# Patient Record
Sex: MALE | Race: WHITE | NOT HISPANIC OR LATINO | Employment: FULL TIME | URBAN - METROPOLITAN AREA
[De-identification: names, ages, dates, MRNs, and addresses within clinical notes are randomized per-mention and may not be internally consistent; named-entity substitution may affect disease eponyms.]

---

## 2024-01-02 ENCOUNTER — HOSPITAL ENCOUNTER (EMERGENCY)
Facility: HOSPITAL | Age: 44
Discharge: HOME | End: 2024-01-02
Attending: EMERGENCY MEDICINE
Payer: COMMERCIAL

## 2024-01-02 ENCOUNTER — APPOINTMENT (EMERGENCY)
Dept: CT IMAGING | Facility: HOSPITAL | Age: 44
End: 2024-01-02
Payer: COMMERCIAL

## 2024-01-02 VITALS
HEART RATE: 99 BPM | TEMPERATURE: 98.9 F | SYSTOLIC BLOOD PRESSURE: 177 MMHG | OXYGEN SATURATION: 100 % | RESPIRATION RATE: 17 BRPM | DIASTOLIC BLOOD PRESSURE: 98 MMHG | WEIGHT: 207.67 LBS

## 2024-01-02 DIAGNOSIS — R07.9 CHEST PAIN: Primary | ICD-10-CM

## 2024-01-02 LAB
2HR DELTA HS TROPONIN: 1 NG/L
ANION GAP SERPL CALCULATED.3IONS-SCNC: 12 MMOL/L
ATRIAL RATE: 91 BPM
BASOPHILS # BLD AUTO: 0.03 THOUSANDS/ÂΜL (ref 0–0.1)
BASOPHILS NFR BLD AUTO: 1 % (ref 0–1)
BUN SERPL-MCNC: 15 MG/DL (ref 5–25)
CALCIUM SERPL-MCNC: 9.2 MG/DL (ref 8.4–10.2)
CARDIAC TROPONIN I PNL SERPL HS: 4 NG/L
CARDIAC TROPONIN I PNL SERPL HS: 5 NG/L
CHLORIDE SERPL-SCNC: 101 MMOL/L (ref 96–108)
CO2 SERPL-SCNC: 22 MMOL/L (ref 21–32)
CREAT SERPL-MCNC: 1 MG/DL (ref 0.6–1.3)
D DIMER PPP FEU-MCNC: 0.48 UG/ML FEU
EOSINOPHIL # BLD AUTO: 0.06 THOUSAND/ÂΜL (ref 0–0.61)
EOSINOPHIL NFR BLD AUTO: 1 % (ref 0–6)
ERYTHROCYTE [DISTWIDTH] IN BLOOD BY AUTOMATED COUNT: 12.6 % (ref 11.6–15.1)
FLUAV RNA RESP QL NAA+PROBE: NEGATIVE
FLUBV RNA RESP QL NAA+PROBE: NEGATIVE
GFR SERPL CREATININE-BSD FRML MDRD: 91 ML/MIN/1.73SQ M
GLUCOSE SERPL-MCNC: 95 MG/DL (ref 65–140)
HCT VFR BLD AUTO: 37.9 % (ref 36.5–49.3)
HGB BLD-MCNC: 13 G/DL (ref 12–17)
IMM GRANULOCYTES # BLD AUTO: 0.02 THOUSAND/UL (ref 0–0.2)
IMM GRANULOCYTES NFR BLD AUTO: 0 % (ref 0–2)
LYMPHOCYTES # BLD AUTO: 0.83 THOUSANDS/ÂΜL (ref 0.6–4.47)
LYMPHOCYTES NFR BLD AUTO: 14 % (ref 14–44)
MCH RBC QN AUTO: 32.7 PG (ref 26.8–34.3)
MCHC RBC AUTO-ENTMCNC: 34.3 G/DL (ref 31.4–37.4)
MCV RBC AUTO: 96 FL (ref 82–98)
MONOCYTES # BLD AUTO: 0.46 THOUSAND/ÂΜL (ref 0.17–1.22)
MONOCYTES NFR BLD AUTO: 8 % (ref 4–12)
NEUTROPHILS # BLD AUTO: 4.62 THOUSANDS/ÂΜL (ref 1.85–7.62)
NEUTS SEG NFR BLD AUTO: 76 % (ref 43–75)
NRBC BLD AUTO-RTO: 0 /100 WBCS
P AXIS: 68 DEGREES
PLATELET # BLD AUTO: 232 THOUSANDS/UL (ref 149–390)
PMV BLD AUTO: 9.2 FL (ref 8.9–12.7)
POTASSIUM SERPL-SCNC: 5.1 MMOL/L (ref 3.5–5.3)
PR INTERVAL: 150 MS
QRS AXIS: 75 DEGREES
QRSD INTERVAL: 94 MS
QT INTERVAL: 392 MS
QTC INTERVAL: 482 MS
RBC # BLD AUTO: 3.97 MILLION/UL (ref 3.88–5.62)
RSV RNA RESP QL NAA+PROBE: NEGATIVE
SARS-COV-2 RNA RESP QL NAA+PROBE: NEGATIVE
SODIUM SERPL-SCNC: 135 MMOL/L (ref 135–147)
T WAVE AXIS: 51 DEGREES
VENTRICULAR RATE: 91 BPM
WBC # BLD AUTO: 6.02 THOUSAND/UL (ref 4.31–10.16)

## 2024-01-02 PROCEDURE — G1004 CDSM NDSC: HCPCS

## 2024-01-02 PROCEDURE — 36415 COLL VENOUS BLD VENIPUNCTURE: CPT | Performed by: EMERGENCY MEDICINE

## 2024-01-02 PROCEDURE — 74174 CTA ABD&PLVS W/CONTRAST: CPT

## 2024-01-02 PROCEDURE — 99285 EMERGENCY DEPT VISIT HI MDM: CPT

## 2024-01-02 PROCEDURE — 84484 ASSAY OF TROPONIN QUANT: CPT | Performed by: EMERGENCY MEDICINE

## 2024-01-02 PROCEDURE — 71275 CT ANGIOGRAPHY CHEST: CPT

## 2024-01-02 PROCEDURE — 85025 COMPLETE CBC W/AUTO DIFF WBC: CPT | Performed by: EMERGENCY MEDICINE

## 2024-01-02 PROCEDURE — 93005 ELECTROCARDIOGRAM TRACING: CPT

## 2024-01-02 PROCEDURE — 85379 FIBRIN DEGRADATION QUANT: CPT | Performed by: EMERGENCY MEDICINE

## 2024-01-02 PROCEDURE — 0241U HB NFCT DS VIR RESP RNA 4 TRGT: CPT | Performed by: EMERGENCY MEDICINE

## 2024-01-02 PROCEDURE — 80048 BASIC METABOLIC PNL TOTAL CA: CPT | Performed by: EMERGENCY MEDICINE

## 2024-01-02 PROCEDURE — 99285 EMERGENCY DEPT VISIT HI MDM: CPT | Performed by: EMERGENCY MEDICINE

## 2024-01-02 RX ORDER — DOXYCYCLINE HYCLATE 100 MG
100 TABLET ORAL 2 TIMES DAILY
Qty: 20 TABLET | Refills: 0 | Status: SHIPPED | OUTPATIENT
Start: 2024-01-02 | End: 2024-01-12

## 2024-01-02 RX ORDER — ALBUTEROL SULFATE 90 UG/1
2 AEROSOL, METERED RESPIRATORY (INHALATION) ONCE
Status: COMPLETED | OUTPATIENT
Start: 2024-01-02 | End: 2024-01-02

## 2024-01-02 RX ORDER — DOXYCYCLINE HYCLATE 100 MG/1
100 CAPSULE ORAL ONCE
Status: COMPLETED | OUTPATIENT
Start: 2024-01-02 | End: 2024-01-02

## 2024-01-02 RX ORDER — SODIUM CHLORIDE 9 MG/ML
3 INJECTION INTRAVENOUS
Status: DISCONTINUED | OUTPATIENT
Start: 2024-01-02 | End: 2024-01-02 | Stop reason: HOSPADM

## 2024-01-02 RX ADMIN — DOXYCYCLINE HYCLATE 100 MG: 100 CAPSULE ORAL at 17:53

## 2024-01-02 RX ADMIN — IOHEXOL 100 ML: 350 INJECTION, SOLUTION INTRAVENOUS at 15:38

## 2024-01-02 RX ADMIN — ALBUTEROL SULFATE 2 PUFF: 90 AEROSOL, METERED RESPIRATORY (INHALATION) at 17:53

## 2024-01-02 NOTE — DISCHARGE INSTRUCTIONS
No sign or heart or lung pathology at this time  Pain is most likely from chest wall pain or bronchitis.  Doxycycline twice daily to fight infection  Albuterol 2 puffs every 6 hours if needed for cough or tightness  Follow-up with your provider or provider for persistent symptoms  Return worsening symptoms or increasing pain or problems

## 2024-01-02 NOTE — ED PROVIDER NOTES
History  Chief Complaint   Patient presents with    Chest Pain     Pt BIB EMS from [employer] [facility] where he works. Was eating lunch and had a sudden onset of CP, SOB, dizziness, headache and feeling lightheaded. Pt normally takes lisinopril for HTN however he hasn't been tolerating it. Hasn't taken for weeks. Initial reports of tachycardia and BP of 202/104. Pt received 1 SL Nitro which brought his pain from 10 to 5. Also rec'd 324 ASA.     HPI is a 43-year-old male apparently sudden onset of severe chest pain described as sharp associated with feeling ill and shaky lightheaded dizzy.  Patient reports the pain is sharp and reports substernal.  He reports some difficulty breathing with the pain.  Patient was ill earlier today cold type symptoms.  Denies any vomiting or diarrhea.  Denies any fever or chills.  He reports primarily cough cold congestion.  Patient reports currently still has some anterior chest pain sharp in nature  Past medical history of hypertension, patient reports had a stress test but had to have it stopped due to hypertension at the time, apparently no follow-up or cardiac catheterization done  Family history noncontributory  Social history, non-smoker no history of drug abuse    None       Past Medical History:   Diagnosis Date    Hypertension        History reviewed. No pertinent surgical history.    History reviewed. No pertinent family history.  I have reviewed and agree with the history as documented.    E-Cigarette/Vaping    E-Cigarette Use Never User      E-Cigarette/Vaping Substances     Social History     Tobacco Use    Smoking status: Never    Smokeless tobacco: Never   Vaping Use    Vaping status: Never Used   Substance Use Topics    Alcohol use: Never    Drug use: Never       Review of Systems   Constitutional:  Negative for diaphoresis, fatigue and fever.   HENT:  Negative for congestion, ear pain, nosebleeds and sore throat.    Eyes:  Negative for photophobia, pain, discharge and visual  disturbance.   Respiratory:  Negative for cough, choking, chest tightness, shortness of breath and wheezing.    Cardiovascular:  Positive for chest pain. Negative for palpitations.   Gastrointestinal:  Negative for abdominal distention, abdominal pain, diarrhea and vomiting.   Genitourinary:  Negative for dysuria, flank pain and frequency.   Musculoskeletal:  Negative for back pain, gait problem and joint swelling.   Skin:  Negative for color change and rash.   Neurological:  Negative for dizziness, syncope and headaches.   Psychiatric/Behavioral:  Negative for behavioral problems and confusion. The patient is not nervous/anxious.    All other systems reviewed and are negative.      Physical Exam  Physical Exam  Vitals and nursing note reviewed.   Constitutional:       Appearance: He is well-developed.   HENT:      Head: Normocephalic.      Right Ear: External ear normal.      Left Ear: External ear normal.      Nose: Nose normal.      Mouth/Throat:      Mouth: Mucous membranes are moist.      Pharynx: Oropharynx is clear.   Eyes:      General: Lids are normal.      Extraocular Movements: Extraocular movements intact.      Pupils: Pupils are equal, round, and reactive to light.   Cardiovascular:      Rate and Rhythm: Normal rate and regular rhythm.      Pulses: Normal pulses.      Heart sounds: Normal heart sounds.   Pulmonary:      Effort: Pulmonary effort is normal. No respiratory distress.      Breath sounds: Normal breath sounds.   Abdominal:      General: Abdomen is flat. Bowel sounds are normal.      Tenderness: There is no abdominal tenderness.   Musculoskeletal:         General: No deformity. Normal range of motion.      Cervical back: Normal range of motion and neck supple.   Skin:     General: Skin is warm and dry.   Neurological:      Mental Status: He is alert and oriented to person, place, and time.   Psychiatric:         Mood and Affect: Mood normal.         Vital Signs  ED Triage Vitals   Temperature  Pulse Respirations Blood Pressure SpO2   01/02/24 1320 01/02/24 1320 01/02/24 1320 01/02/24 1320 01/02/24 1320   98.9 °F (37.2 °C) 99 17 (!) 177/98 98 %      Temp src Heart Rate Source Patient Position - Orthostatic VS BP Location FiO2 (%)   -- 01/02/24 1320 01/02/24 1320 01/02/24 1320 --    Monitor Lying Left arm       Pain Score       01/02/24 1324       5           Vitals:    01/02/24 1320   BP: (!) 177/98   Pulse: 99   Patient Position - Orthostatic VS: Lying         Visual Acuity      ED Medications  Medications   iohexol (OMNIPAQUE) 350 MG/ML injection (MULTI-DOSE) 100 mL (100 mL Intravenous Given 1/2/24 1538)   doxycycline hyclate (VIBRAMYCIN) capsule 100 mg (100 mg Oral Given 1/2/24 1753)   albuterol (PROVENTIL HFA,VENTOLIN HFA) inhaler 2 puff (2 puffs Inhalation Given 1/2/24 1753)       Diagnostic Studies  Results Reviewed       Procedure Component Value Units Date/Time    HS Troponin I 2hr [[id number]]  (Normal) Collected: 01/02/24 1551    Lab Status: Final result Specimen: Blood from Arm, Left Updated: 01/02/24 1621     hs TnI 2hr 5 ng/L      Delta 2hr hsTnI 1 ng/L     FLU/RSV/COVID - if FLU/RSV clinically relevant [[id number]]  (Normal) Collected: 01/02/24 1342    Lab Status: Final result Specimen: Nares from Nose Updated: 01/02/24 1426     SARS-CoV-2 Negative     INFLUENZA A PCR Negative     INFLUENZA B PCR Negative     RSV PCR Negative    Narrative:      FOR PEDIATRIC PATIENTS - copy/paste COVID Guidelines URL to browser: [email or web address]    SARS-CoV-2 assay is a Nucleic Acid Amplification assay intended for the  qualitative detection of nucleic acid from SARS-CoV-2 in nasopharyngeal  swabs. Results are for the presumptive identification of SARS-CoV-2 RNA.    Positive results are indicative of infection with SARS-CoV-2, the virus  causing COVID-19, but do not rule out bacterial infection or co-infection  with other viruses. Laboratories within the  [place] and its  territories are required to report all positive results to the appropriate  public health authorities. Negative results do not preclude SARS-CoV-2  infection and should not be used as the sole basis for treatment or other  patient management decisions. Negative results must be combined with  clinical observations, patient history, and epidemiological information.  This test has not been FDA cleared or approved.    This test has been authorized by FDA under an Emergency Use Authorization  (EUA). This test is only authorized for the duration of time the  declaration that circumstances exist justifying the authorization of the  emergency use of an in vitro diagnostic tests for detection of SARS-CoV-2  virus and/or diagnosis of COVID-19 infection under section 564(b)(1) of  the Act, 21 U.S.C. 360bbb-3(b)(1), unless the authorization is terminated  or revoked sooner. The test has been validated but independent review by FDA  and CLIA is pending.    Test performed using [employer] GeneXpert: This RT-PCR assay targets N2,  a region unique to SARS-CoV-2. A conserved region in the E-gene was chosen  for pan-Sarbecovirus detection which includes SARS-CoV-2.    According to CMS-2020-01-R, this platform meets the definition of high-throughput technology.    HS Troponin 0hr (reflex protocol) [[id number]]  (Normal) Collected: 01/02/24 1342    Lab Status: Final result Specimen: Blood from Arm, Left Updated: 01/02/24 1417     hs TnI 0hr 4 ng/L     Basic metabolic panel [[id number]] Collected: 01/02/24 1342    Lab Status: Final result Specimen: Blood from Arm, Left Updated: 01/02/24 1414     Sodium 135 mmol/L      Potassium 5.1 mmol/L      Chloride 101 mmol/L      CO2 22 mmol/L      ANION GAP 12 mmol/L      BUN 15 mg/dL      Creatinine 1.00 mg/dL      Glucose 95 mg/dL      Calcium 9.2 mg/dL      eGFR 91 ml/min/1.73sq m     Narrative:      National Kidney Disease Foundation guidelines for Chronic Kidney Disease  (CKD):     Stage 1 with normal or high GFR (GFR > 90 mL/min/1.73 square meters)    Stage 2 Mild CKD (GFR = 60-89 mL/min/1.73 square meters)    Stage 3A Moderate CKD (GFR = 45-59 mL/min/1.73 square meters)    Stage 3B Moderate CKD (GFR = 30-44 mL/min/1.73 square meters)    Stage 4 Severe CKD (GFR = 15-29 mL/min/1.73 square meters)    Stage 5 End Stage CKD (GFR <15 mL/min/1.73 square meters)  Note: GFR calculation is accurate only with a steady state creatinine    D-dimer, quantitative [[id number]]  (Normal) Collected: 01/02/24 1342    Lab Status: Final result Specimen: Blood from Arm, Left Updated: 01/02/24 1401     D-Dimer, Quant 0.48 ug/ml FEU     CBC and differential [[id number]]  (Abnormal) Collected: 01/02/24 1342    Lab Status: Final result Specimen: Blood from Arm, Left Updated: 01/02/24 1348     WBC 6.02 Thousand/uL      RBC 3.97 Million/uL      Hemoglobin 13.0 g/dL      Hematocrit 37.9 %      MCV 96 fL      MCH 32.7 pg      MCHC 34.3 g/dL      RDW 12.6 %      MPV 9.2 fL      Platelets 232 Thousands/uL      nRBC 0 /100 WBCs      Neutrophils Relative 76 %      Immat GRANS % 0 %      Lymphocytes Relative 14 %      Monocytes Relative 8 %      Eosinophils Relative 1 %      Basophils Relative 1 %      Neutrophils Absolute 4.62 Thousands/µL      Immature Grans Absolute 0.02 Thousand/uL      Lymphocytes Absolute 0.83 Thousands/µL      Monocytes Absolute 0.46 Thousand/µL      Eosinophils Absolute 0.06 Thousand/µL      Basophils Absolute 0.03 Thousands/µL                    CTA dissection protocol chest/abdomen/pelvis   Final Result by Dave Milan DO (01/02 1728)      No aortic aneurysm or dissection.   Fatty liver.            Workstation performed: [id number]                    Procedures  ECG 12 Lead Documentation Only    Date/Time: 1/2/2024 2:01 PM    Performed by: Raymond Gordon MD  Authorized by: Raymond Gordon MD    Indications / Diagnosis:  Chest pain  Patient location:  ED  Previous ECG:     Previous ECG:   Unavailable  Interpretation:     Interpretation: non-specific    Rate:     ECG rate:  91    ECG rate assessment: normal    Rhythm:     Rhythm: sinus rhythm    Comments:      Normal sinus rhythm no acute ST-T wave changes no ST elevations             ED Course       Diagnostic testing  COVID testing RSV testing and influenza testing was negative.  Initial cardiac troponin was 4 no sign of cardiac ischemia, will await second cardiac troponin  Electrolytes within normal limits normal BUN/creatinine no sign of renal dysfunction  D-dimer was negative no sign of pulmonary embolism low risk for pulmonary emboli  White count was normal at 6.0 no sign of inflammation hemoglobin is normal at 13 Asceniv anemia.    Patient second troponin was 5, delta of 1, no sign of ischemia no indication for admission low risk for coronary artery disease    Patient had sudden onset severe sharp pain consistent with a dissection so CT was done to rule out dissection it was negative.  Reviewed with patient gave him a copy of the report.    HEART Risk Score      Flowsheet Row Most Recent Value   Heart Score Risk Calculator    History 0 Filed at: 01/02/2024 1434   ECG 0 Filed at: 01/02/2024 1434   Age 0 Filed at: 01/02/2024 1434   Risk Factors 1 Filed at: 01/02/2024 1434   Troponin 0 Filed at: 01/02/2024 1434   HEART Score 1 Filed at: 01/02/2024 1434                          SBIRT 22yo+      Flowsheet Row Most Recent Value   Initial Alcohol Screen: US AUDIT-C     1. How often do you have a drink containing alcohol? 0 Filed at: 01/02/2024 1355   2. How many drinks containing alcohol do you have on a typical day you are drinking?  0 Filed at: 01/02/2024 1355   3a. Male UNDER 65: How often do you have five or more drinks on one occasion? 0 Filed at: 01/02/2024 1355   Audit-C Score 0 Filed at: 01/02/2024 1355   TOM: How many times in the past year have you...    Used an illegal drug or used a prescription medication for non-medical reasons? Never  Filed at: 01/02/2024 1355                      Medical Decision Making  Medical decision making 43-year-old male reports feeling somewhat ill earlier today with viral type symptoms at work developed sudden onset of severe sharp chest pain stabbing in nature going through his chest.  Patient was markedly uncomfortable initially with the pain he and arrived by EMS.  Diagnostic workup showed a EKG showed no acute changes no ST elevation MI.  Cardiac troponin was normal and repeat cardiac troponin was also normal delta of 1 no sign of acute ischemia.  Low risk by heart score.  Safe to discharge by heart pathway.  Patient complained of sharp chest pain so CT was done rule out dissection it was negative.  Discussed with patient no sign of ischemia no indication for admission he agrees we discussed outpatient treatment and follow-up patient had some cough and some congestion hospital bronchitis, will treat with antibiotics.  Patient requested an inhaler    Amount and/or Complexity of Data Reviewed  Labs: ordered.  Radiology: ordered.    Risk  Prescription drug management.             Disposition  Final diagnoses:   Chest pain     Time reflects when diagnosis was documented in both MDM as applicable and the Disposition within this note       Time User Action Codes Description Comment    1/2/2024  5:25 PM Raymond Gordon Add [R07.9] Chest pain           ED Disposition       ED Disposition   Discharge    Condition   Stable    Date/Time   Tue Jan 2, 2024 1725    Comment   Santiago Monterroso discharge to home/self care.                   Follow-up Information       Follow up With Specialties Details Why Contact Info Additional Information    [address] [id number] [phone] [address] [place], [id number] [phone]            Discharge Medication List as of 1/2/2024  5:46 PM        START  taking these medications    Details   doxycycline hyclate (VIBRA-TABS) 100 mg tablet Take 1 tablet (100 mg total) by mouth 2 (two) times a day for 10 days, Starting Tue 1/2/2024, Until Fri 1/12/2024, Normal             No discharge procedures on file.    PDMP Review       None            ED Provider  Electronically Signed by             Raymond Gordon MD  01/02/24 5624

## 2024-01-04 LAB
ATRIAL RATE: 78 BPM
P AXIS: 54 DEGREES
PR INTERVAL: 144 MS
QRS AXIS: 74 DEGREES
QRSD INTERVAL: 98 MS
QT INTERVAL: 400 MS
QTC INTERVAL: 456 MS
T WAVE AXIS: 61 DEGREES
VENTRICULAR RATE: 78 BPM

## 2024-10-17 ENCOUNTER — HOSPITAL ENCOUNTER (OUTPATIENT)
Facility: HOSPITAL | Age: 44
Setting detail: OBSERVATION
End: 2024-10-19
Attending: EMERGENCY MEDICINE | Admitting: INTERNAL MEDICINE
Payer: COMMERCIAL

## 2024-10-17 ENCOUNTER — APPOINTMENT (OUTPATIENT)
Dept: CT IMAGING | Facility: HOSPITAL | Age: 44
End: 2024-10-17
Payer: COMMERCIAL

## 2024-10-17 ENCOUNTER — TELEPHONE (OUTPATIENT)
Dept: PSYCHIATRY | Facility: CLINIC | Age: 44
End: 2024-10-17

## 2024-10-17 DIAGNOSIS — R65.10 SIRS (SYSTEMIC INFLAMMATORY RESPONSE SYNDROME) (HCC): ICD-10-CM

## 2024-10-17 DIAGNOSIS — N17.9 AKI (ACUTE KIDNEY INJURY) (HCC): ICD-10-CM

## 2024-10-17 DIAGNOSIS — Z00.8 MEDICAL CLEARANCE FOR PSYCHIATRIC ADMISSION: ICD-10-CM

## 2024-10-17 DIAGNOSIS — R46.89 ABNORMAL BEHAVIOR: Primary | ICD-10-CM

## 2024-10-17 DIAGNOSIS — F29 PSYCHOSIS (HCC): ICD-10-CM

## 2024-10-17 DIAGNOSIS — M62.82 RHABDOMYOLYSIS: ICD-10-CM

## 2024-10-17 PROBLEM — Z78.9 ALCOHOL USE: Status: ACTIVE | Noted: 2024-10-17

## 2024-10-17 PROBLEM — E87.20 METABOLIC ACIDOSIS: Status: ACTIVE | Noted: 2024-10-17

## 2024-10-17 PROBLEM — E87.1 HYPONATREMIA: Status: ACTIVE | Noted: 2024-10-17

## 2024-10-17 PROBLEM — G93.41 METABOLIC ENCEPHALOPATHY: Status: ACTIVE | Noted: 2024-10-17

## 2024-10-17 LAB
ALBUMIN SERPL BCG-MCNC: 4.7 G/DL (ref 3.5–5)
ALP SERPL-CCNC: 39 U/L (ref 34–104)
ALT SERPL W P-5'-P-CCNC: 38 U/L (ref 7–52)
ANION GAP SERPL CALCULATED.3IONS-SCNC: 20 MMOL/L (ref 4–13)
APAP SERPL-MCNC: <2 UG/ML (ref 10–20)
APTT PPP: 28 SECONDS (ref 23–34)
AST SERPL W P-5'-P-CCNC: 74 U/L (ref 13–39)
ATRIAL RATE: 95 BPM
BASOPHILS # BLD AUTO: 0.02 THOUSANDS/ΜL (ref 0–0.1)
BASOPHILS NFR BLD AUTO: 0 % (ref 0–1)
BILIRUB SERPL-MCNC: 2.23 MG/DL (ref 0.2–1)
BUN SERPL-MCNC: 35 MG/DL (ref 5–25)
CALCIUM SERPL-MCNC: 9.7 MG/DL (ref 8.4–10.2)
CHLORIDE SERPL-SCNC: 94 MMOL/L (ref 96–108)
CK SERPL-CCNC: 2968 U/L (ref 39–308)
CO2 SERPL-SCNC: 17 MMOL/L (ref 21–32)
CREAT SERPL-MCNC: 1.61 MG/DL (ref 0.6–1.3)
EOSINOPHIL # BLD AUTO: 0.01 THOUSAND/ΜL (ref 0–0.61)
EOSINOPHIL NFR BLD AUTO: 0 % (ref 0–6)
ERYTHROCYTE [DISTWIDTH] IN BLOOD BY AUTOMATED COUNT: 12.7 % (ref 11.6–15.1)
ETHANOL EXG-MCNC: 0 MG/DL
ETHANOL SERPL-MCNC: <10 MG/DL
FLUAV RNA RESP QL NAA+PROBE: NEGATIVE
FLUBV RNA RESP QL NAA+PROBE: NEGATIVE
GFR SERPL CREATININE-BSD FRML MDRD: 51 ML/MIN/1.73SQ M
GLUCOSE SERPL-MCNC: 101 MG/DL (ref 65–140)
HCT VFR BLD AUTO: 40.1 % (ref 36.5–49.3)
HGB BLD-MCNC: 13.2 G/DL (ref 12–17)
IMM GRANULOCYTES # BLD AUTO: 0.05 THOUSAND/UL (ref 0–0.2)
IMM GRANULOCYTES NFR BLD AUTO: 0 % (ref 0–2)
INR PPP: 0.91 (ref 0.85–1.19)
LACTATE SERPL-SCNC: 0.6 MMOL/L (ref 0.5–2)
LYMPHOCYTES # BLD AUTO: 1.49 THOUSANDS/ΜL (ref 0.6–4.47)
LYMPHOCYTES NFR BLD AUTO: 10 % (ref 14–44)
MAGNESIUM SERPL-MCNC: 2.1 MG/DL (ref 1.9–2.7)
MCH RBC QN AUTO: 32.4 PG (ref 26.8–34.3)
MCHC RBC AUTO-ENTMCNC: 32.9 G/DL (ref 31.4–37.4)
MCV RBC AUTO: 99 FL (ref 82–98)
MONOCYTES # BLD AUTO: 1.53 THOUSAND/ΜL (ref 0.17–1.22)
MONOCYTES NFR BLD AUTO: 11 % (ref 4–12)
NEUTROPHILS # BLD AUTO: 11.25 THOUSANDS/ΜL (ref 1.85–7.62)
NEUTS SEG NFR BLD AUTO: 79 % (ref 43–75)
NRBC BLD AUTO-RTO: 0 /100 WBCS
P AXIS: 77 DEGREES
PLATELET # BLD AUTO: 248 THOUSANDS/UL (ref 149–390)
PMV BLD AUTO: 9.7 FL (ref 8.9–12.7)
POTASSIUM SERPL-SCNC: 3.9 MMOL/L (ref 3.5–5.3)
PR INTERVAL: 148 MS
PROCALCITONIN SERPL-MCNC: 0.5 NG/ML
PROT SERPL-MCNC: 7.8 G/DL (ref 6.4–8.4)
PROTHROMBIN TIME: 13 SECONDS (ref 12.3–15)
QRS AXIS: 78 DEGREES
QRSD INTERVAL: 84 MS
QT INTERVAL: 362 MS
QTC INTERVAL: 454 MS
RBC # BLD AUTO: 4.07 MILLION/UL (ref 3.88–5.62)
RSV RNA RESP QL NAA+PROBE: NEGATIVE
SALICYLATES SERPL-MCNC: <5 MG/DL (ref 3–20)
SARS-COV-2 RNA RESP QL NAA+PROBE: NEGATIVE
SODIUM SERPL-SCNC: 131 MMOL/L (ref 135–147)
T WAVE AXIS: 74 DEGREES
VENTRICULAR RATE: 95 BPM
WBC # BLD AUTO: 14.35 THOUSAND/UL (ref 4.31–10.16)

## 2024-10-17 PROCEDURE — 96372 THER/PROPH/DIAG INJ SC/IM: CPT

## 2024-10-17 PROCEDURE — 99204 OFFICE O/P NEW MOD 45 MIN: CPT | Performed by: PSYCHIATRY & NEUROLOGY

## 2024-10-17 PROCEDURE — 80143 DRUG ASSAY ACETAMINOPHEN: CPT | Performed by: PHYSICIAN ASSISTANT

## 2024-10-17 PROCEDURE — 96367 TX/PROPH/DG ADDL SEQ IV INF: CPT

## 2024-10-17 PROCEDURE — 99284 EMERGENCY DEPT VISIT MOD MDM: CPT

## 2024-10-17 PROCEDURE — 70450 CT HEAD/BRAIN W/O DYE: CPT

## 2024-10-17 PROCEDURE — 80053 COMPREHEN METABOLIC PANEL: CPT | Performed by: EMERGENCY MEDICINE

## 2024-10-17 PROCEDURE — 99223 1ST HOSP IP/OBS HIGH 75: CPT | Performed by: PHYSICIAN ASSISTANT

## 2024-10-17 PROCEDURE — 36415 COLL VENOUS BLD VENIPUNCTURE: CPT | Performed by: EMERGENCY MEDICINE

## 2024-10-17 PROCEDURE — 93005 ELECTROCARDIOGRAM TRACING: CPT

## 2024-10-17 PROCEDURE — 93010 ELECTROCARDIOGRAM REPORT: CPT | Performed by: INTERNAL MEDICINE

## 2024-10-17 PROCEDURE — 82550 ASSAY OF CK (CPK): CPT | Performed by: EMERGENCY MEDICINE

## 2024-10-17 PROCEDURE — 85025 COMPLETE CBC W/AUTO DIFF WBC: CPT | Performed by: EMERGENCY MEDICINE

## 2024-10-17 PROCEDURE — 0241U HB NFCT DS VIR RESP RNA 4 TRGT: CPT | Performed by: PHYSICIAN ASSISTANT

## 2024-10-17 PROCEDURE — 99285 EMERGENCY DEPT VISIT HI MDM: CPT | Performed by: EMERGENCY MEDICINE

## 2024-10-17 PROCEDURE — 85730 THROMBOPLASTIN TIME PARTIAL: CPT | Performed by: EMERGENCY MEDICINE

## 2024-10-17 PROCEDURE — 85610 PROTHROMBIN TIME: CPT | Performed by: EMERGENCY MEDICINE

## 2024-10-17 PROCEDURE — 82077 ASSAY SPEC XCP UR&BREATH IA: CPT | Performed by: PHYSICIAN ASSISTANT

## 2024-10-17 PROCEDURE — 80179 DRUG ASSAY SALICYLATE: CPT | Performed by: PHYSICIAN ASSISTANT

## 2024-10-17 PROCEDURE — 87040 BLOOD CULTURE FOR BACTERIA: CPT | Performed by: PHYSICIAN ASSISTANT

## 2024-10-17 PROCEDURE — 83605 ASSAY OF LACTIC ACID: CPT | Performed by: PHYSICIAN ASSISTANT

## 2024-10-17 PROCEDURE — 96365 THER/PROPH/DIAG IV INF INIT: CPT

## 2024-10-17 PROCEDURE — 82075 ASSAY OF BREATH ETHANOL: CPT | Performed by: EMERGENCY MEDICINE

## 2024-10-17 PROCEDURE — 84145 PROCALCITONIN (PCT): CPT | Performed by: PHYSICIAN ASSISTANT

## 2024-10-17 PROCEDURE — 83735 ASSAY OF MAGNESIUM: CPT | Performed by: EMERGENCY MEDICINE

## 2024-10-17 RX ORDER — ONDANSETRON 2 MG/ML
4 INJECTION INTRAMUSCULAR; INTRAVENOUS EVERY 6 HOURS PRN
Status: DISCONTINUED | OUTPATIENT
Start: 2024-10-17 | End: 2024-10-19 | Stop reason: HOSPADM

## 2024-10-17 RX ORDER — ACETAMINOPHEN 325 MG/1
650 TABLET ORAL EVERY 6 HOURS PRN
Status: DISCONTINUED | OUTPATIENT
Start: 2024-10-17 | End: 2024-10-19 | Stop reason: HOSPADM

## 2024-10-17 RX ORDER — OLANZAPINE 10 MG/2ML
5 INJECTION, POWDER, FOR SOLUTION INTRAMUSCULAR
Status: DISCONTINUED | OUTPATIENT
Start: 2024-10-17 | End: 2024-10-19 | Stop reason: HOSPADM

## 2024-10-17 RX ORDER — LORAZEPAM 2 MG/ML
2 INJECTION INTRAMUSCULAR ONCE
Status: COMPLETED | OUTPATIENT
Start: 2024-10-17 | End: 2024-10-17

## 2024-10-17 RX ORDER — HALOPERIDOL 5 MG/ML
5 INJECTION INTRAMUSCULAR ONCE
Status: DISCONTINUED | OUTPATIENT
Start: 2024-10-17 | End: 2024-10-17

## 2024-10-17 RX ORDER — OLANZAPINE 5 MG/1
10 TABLET, ORALLY DISINTEGRATING ORAL DAILY
Status: DISCONTINUED | OUTPATIENT
Start: 2024-10-18 | End: 2024-10-17

## 2024-10-17 RX ORDER — FOLIC ACID 1 MG/1
1 TABLET ORAL DAILY
Status: DISCONTINUED | OUTPATIENT
Start: 2024-10-18 | End: 2024-10-19 | Stop reason: HOSPADM

## 2024-10-17 RX ORDER — SODIUM CHLORIDE 9 MG/ML
150 INJECTION, SOLUTION INTRAVENOUS CONTINUOUS
Status: DISCONTINUED | OUTPATIENT
Start: 2024-10-17 | End: 2024-10-19

## 2024-10-17 RX ORDER — MAGNESIUM HYDROXIDE/ALUMINUM HYDROXICE/SIMETHICONE 120; 1200; 1200 MG/30ML; MG/30ML; MG/30ML
30 SUSPENSION ORAL EVERY 6 HOURS PRN
Status: DISCONTINUED | OUTPATIENT
Start: 2024-10-17 | End: 2024-10-19 | Stop reason: HOSPADM

## 2024-10-17 RX ORDER — LANOLIN ALCOHOL/MO/W.PET/CERES
6 CREAM (GRAM) TOPICAL
Status: DISCONTINUED | OUTPATIENT
Start: 2024-10-17 | End: 2024-10-19 | Stop reason: HOSPADM

## 2024-10-17 RX ORDER — LANOLIN ALCOHOL/MO/W.PET/CERES
100 CREAM (GRAM) TOPICAL DAILY
Status: DISCONTINUED | OUTPATIENT
Start: 2024-10-18 | End: 2024-10-19 | Stop reason: HOSPADM

## 2024-10-17 RX ADMIN — SODIUM CHLORIDE 2000 ML: 0.9 INJECTION, SOLUTION INTRAVENOUS at 20:12

## 2024-10-17 RX ADMIN — LORAZEPAM 2 MG: 2 INJECTION INTRAMUSCULAR; INTRAVENOUS at 18:14

## 2024-10-17 RX ADMIN — SODIUM CHLORIDE 150 ML/HR: 0.9 INJECTION, SOLUTION INTRAVENOUS at 22:30

## 2024-10-17 RX ADMIN — FOLIC ACID 1 MG: 5 INJECTION, SOLUTION INTRAMUSCULAR; INTRAVENOUS; SUBCUTANEOUS at 19:17

## 2024-10-17 RX ADMIN — SODIUM CHLORIDE 1000 ML: 0.9 INJECTION, SOLUTION INTRAVENOUS at 18:17

## 2024-10-17 RX ADMIN — THIAMINE HYDROCHLORIDE 100 MG: 100 INJECTION, SOLUTION INTRAMUSCULAR; INTRAVENOUS at 18:17

## 2024-10-17 NOTE — TELEMEDICINE
"Tele-Consultation - Behavioral Health   Name: Santiago Monterroso 44 y.o. male I MRN: [id number]  Unit/Bed#: ED 08 I Date of Admission: 10/17/2024   Date of Service: 10/17/2024 I Hospital Day: 0   Consult to Psychiatry  Consult performed by: Matheus Mcdonald MD  Consult ordered by: Gita Arthur DO        Physician Requesting Evaluation: Gita Arthur DO   Reason for Evaluation / Principal Problem: Abnormal behavior    Assessment & Plan        Unspecified psychosis; rule out acute encephalopathy/delirium; alcohol use disorder; rule out alcohol withdrawal delirium; methamphetamine use disorder; rule out methamphetamine induced psychosis    Treatment Plan:    Recommend monitoring and treating with benzodiazepine of choice for alcohol withdrawal under [facility] protocol with thiamine and folate supplementation.  If visual hallucinations/psychotic features failed to respond to benzodiazepine treatment for alcohol withdrawal, consider Zyprexa 5 mg p.o. daily with additional Zyprexa 5 to 10 mg p.o. or IM every 6 hours as needed psychosis/agitation.  Total Zyprexa should not exceed 30 mg per 24 hours.  Upon medical clearance, recommend inpatient/residential alcohol/drug rehab.  As the patient's insight and judgment are currently gravely impaired by psychosis/delirium, the patient should not be permitted to sign out AGAINST MEDICAL ADVICE.  If he does not consent to treatment voluntarily, involuntary treatment is indicated.  Continue current precautions.  Reconsult psychiatry as needed.    Risks / Benefits of Treatment:  Risks, benefits, and possible side effects of medications explained to patient and patient verbalizes understanding.      History of Present Illness    Reason for Consult:    Abnormal behavior  Crisis has documented the following information: \"       Santiago Monterroso is a 43 y/o male, diagnosed with no known diagnoses who presented to ED via police car due to:           Psychiatric Evaluation       Patient " "arrives with EMS and [facility]SP with reports of being out in the woods without a shirt for 24-48 hours. Patient reports being at a retreat for drug and alcohol detox and states he went on a hike. At some point a missing persons report was filed. Pt was found, and [facility] is seeking to petition a    Pt appears guarded. Oriented to self, day, year. Pt presents with minimal eye contact.  Pt states that a couple of days ago he decided to take a hike in the forest and walked 50 plus miles. Pt states he found himself shirtless, and most likely have frost bite on his foot. Pt states that 2 days ago he took \"hallucinotic drug\" and that he does not remember much since then. Pt states his wife was aware where he was but contacted 911 and reported him as a missing person. At some point today while pt was walking state police stopped to talk to pt and brought him in to ED for evaluation.   Pt denies SI, HI, self harming. Pt denies current hallucinations. No past or present mental health services reported. No appetite or sleep problems reported. Pt would like to be discharge home at this time.      \"    In meeting with the patient, his story changed through the course of the assessment.  He admitted that he had been on a Buddhism recovery retreat with other men when he had an opportunity to use methamphetamine and took that opportunity.  He states he recalls very little of what happened afterwards.  Initially he stated he had been missing for 1 day and later said he was missing for 2 days facedown in the mud.  He states he is not sure what happened to his shirt.  History was inconsistent over time.    Past psychiatric history: The patient denied any past psychiatric history.  When asked specifically regarding any rehab or recovery work he was very vague about this as well but did admit the men's retreat he was hide was recovery related.    Social history: The patient is .  He has no children with his wife but states he has 3 " "children that do not live with him.  He is employed as a  at an [employer].  He reports no abuse.    Family history: Unremarkable per patient    Substance use history: Patient states he had used crystal methamphetamine quite sometime ago and denied any use until using a couple of days ago.  He states he drinks alcohol heavily \"everything I can get my hands on until it is gone\".  He denies he has had anything to drink over the last 4 days but again was very inconsistent regarding timeline.  He denies other substance use.    [place] suicide severity risk L: The patient denies any history of death wishes or suicidal ideation scoring low risk for suicide.    Mental status examination: The patient is alert and grossly oriented in all spheres initially.  At times however he appeared confused as to the current situation.  He appears somewhat disheveled.  Speech was unremarkable.  At times thought process was circumstantial and tangential.  He appears to be of average intelligence by his use of vocabulary, general fund of knowledge, sentence structure and syntax and historical occupational history working as a  for the [employer].  He denies suicidal homicidal ideation.  While assessing the patient he stated he had sudden onset of visual hallucinations seeing things or people move across the room that were not present.  He said he heard people talking also but was unclear as to whether the people may have been present and and adjoining area of the hospital.  Mental status appears to be waxing and waning causing impairment of insight and judgment at this time.      Substance Abuse History:  E-Cigarette/Vaping    E-Cigarette Use Never User       E-Cigarette/Vaping Substances       Social History       Tobacco History       Smoking Status  Never      Smokeless Tobacco Use  Never              Alcohol History       Alcohol Use Status  Never              Drug Use       Drug Use Status  Yes Types  Methamphetamines "              Sexual Activity       Sexually Active  Not Asked              Activities of Daily Living    Not Asked                   I have assessed this patient for substance use within the past 12 months    Objective   Temp:  [97.7 °F (36.5 °C)] 97.7 °F (36.5 °C)  HR:  [] 101  BP: (175)/(95) 175/95  Resp:  [17-18] 17  SpO2:  [100 %] 100 %  O2 Device: None (Room air)  No intake or output data in the 24 hours ending 10/17/24 1705          Patient Strengths/Assets: general fund of knowledge, work skills  Patient Barriers/Limitations: impaired cognition, poor insight, poor reasoning ability      Lab Results: I have reviewed the following results:Most Recent Labs:   Lab Results   Component Value Date    WBC 6.02 01/02/2024    RBC 3.97 01/02/2024    HGB 13.0 01/02/2024    HCT 37.9 01/02/2024     01/02/2024    RDW 12.6 01/02/2024    NEUTROABS 4.62 01/02/2024    SODIUM 135 01/02/2024    K 5.1 01/02/2024     01/02/2024    CO2 22 01/02/2024    BUN 15 01/02/2024    CREATININE 1.00 01/02/2024    GLUC 95 01/02/2024    CALCIUM 9.2 01/02/2024    AST 37 01/20/2023    ALT 49 01/20/2023    ALKPHOS 56 01/20/2023    TP 7.3 01/20/2023    ALB 4.0 01/20/2023    TBILI 0.9 01/20/2023    HGBA1C 5.4 04/20/2021     04/20/2021            Administrative Statements   I have spent a total time of 30 minutes in caring for this patient on the day of the visit/encounter including Prognosis, Risks and benefits of tx options, Instructions for management, Patient and family education, Importance of tx compliance, Risk factor reductions, Impressions, Counseling / Coordination of care, Documenting in the medical record, Reviewing / ordering tests, medicine, procedures  , Obtaining or reviewing history  , and Communicating with other healthcare professionals .  VIRTUAL CARE DOCUMENTATION:     1. This service was provided via Telemedicine using Teams Virtual Rounding      2. Parties in the room with patient during teleconsult  Patient only    3. Confidentiality My office door was closed     4. Participants No one else was in the room    5. Patient acknowledged consent and understanding of privacy and security of the  Telemedicine consult. I informed the patient that I have reviewed their record in Epic and presented the opportunity for them to ask any questions regarding the visit today.  The patient agreed to participate.    6. I have spent a total time of 30 minutes in caring for this patient on the day of the visit/encounter including Prognosis, Risks and benefits of tx options, Instructions for management, Patient and family education, Importance of tx compliance, Risk factor reductions, Impressions, Counseling / Coordination of care, Documenting in the medical record, Reviewing / ordering tests, medicine, procedures  , Obtaining or reviewing history  , and Communicating with other healthcare professionals .

## 2024-10-17 NOTE — ED NOTES
Per psychiatrist :    Recommend monitoring and treating with benzodiazepine of choice for alcohol withdrawal under [facility] protocol with thiamine and folate supplementation. If visual hallucinations/psychotic features failed to respond to benzodiazepine treatment for alcohol withdrawal, consider Zyprexa 5 mg p.o. daily with additional Zyprexa 5 to 10 mg p.o. or IM every 6 hours as needed psychosis/agitation. Total Zyprexa should not exceed 30 mg per 24 hours. Upon medical clearance, recommend inpatient/residential alcohol/drug rehab. As the patient's insight and judgment are currently gravely impaired by psychosis/delirium, the patient should not be permitted to sign out AGAINST MEDICAL ADVICE. If he does not consent to treatment voluntarily, involuntary treatment is indicated. Continue current precautions. Reconsult psychiatry as needed.

## 2024-10-17 NOTE — LETTER
Formerly [facility] 3RD FLOOR MED SURG UNIT  [address]  [facility] [address]  Dept: [phone]      ACUTE CARE TRANSFER CONSENT    NAME Santiago Monterroso                                         1980                              MRN [id number]    I have been informed of my rights regarding examination, treatment, and transfer   by Dr. Karthikeyan Jaquez MD    Benefits: Specialized equipment and/or services available at the receiving facility (Include comment)________________________    Risks: Potential for delay in receiving treatment      Consent for Transfer:  I acknowledge that my medical condition has been evaluated and explained to me by the treating physician or other qualified medical person and/or my attending physician, who has recommended that I be transferred to the service of  Accepting Physician: Dr. Haja Lima at Accepting Facility Name, City & State : [facility]. The above potential benefits of such transfer, the potential risks associated with such transfer, and the probable risks of not being transferred have been explained to me, and I fully understand them.  The doctor has explained that, in my case, the benefits of transfer outweigh the risks.  I agree to be transferred.    I authorize the performance of emergency medical procedures and treatments upon me in both transit and upon arrival at the receiving facility.  Additionally, I authorize the release of any and all medical records to the receiving facility and request they be transported with me, if possible.  I understand that the safest mode of transportation during a medical emergency is an ambulance and that the Hospital advocates the use of this mode of transport. Risks of traveling to the receiving facility by car, including absence of medical control, life sustaining equipment, such as oxygen, and medical personnel has been explained to me and I fully understand them.    (BONG CORRECT BOX  BELOW)  [  ]  I consent to the stated transfer and to be transported by ambulance/helicopter.  [  ]  I consent to the stated transfer, but refuse transportation by ambulance and accept full responsibility for my transportation by car.  I understand the risks of non-ambulance transfers and I exonerate the Hospital and its staff from any deterioration in my condition that results from this refusal.    X___________________________________________    DATE  10/19/24  TIME________  Signature of patient or legally responsible individual signing on patient behalf           RELATIONSHIP TO PATIENT_________________________                  Provider Certification    NAME Santiago Monterroso                                         1980                              MRN [id number]    A medical screening exam was performed on the above named patient.  Based on the examination:    Condition Necessitating Transfer ***    Patient Condition: The patient has been stabilized such that within reasonable medical probability, no material deterioration of the patient condition or the condition of the unborn child(mustapha) is likely to result from the transfer    Reason for Transfer: Level of Care needed not available at this facility    Transfer Requirements: Facility [facility]   Space available and qualified personnel available for treatment as acknowledged by Jinny Hunt   Agreed to accept transfer and to provide appropriate medical treatment as acknowledged by       Dr. Haja Lima  Appropriate medical records of the examination and treatment of the patient are provided at the time of transfer   STAFF INITIAL WHEN COMPLETED ___CD____  Transfer will be performed by qualified personnel from ______________________  and appropriate transfer equipment as required, including the use of necessary and appropriate life support measures.    Provider Certification: I have examined the patient and explained the  following risks and benefits of being transferred/refusing transfer to the patient/family:  The patient is stable for psychiatric transfer because they are medically stable, and is protected from harming him/herself or others during transport      Based on these reasonable risks and benefits to the patient and/or the unborn child(mustapha), and based upon the information available at the time of the patient’s examination, I certify that the medical benefits reasonably to be expected from the provision of appropriate medical treatments at another medical facility outweigh the increasing risks, if any, to the individual’s medical condition, and in the case of labor to the unborn child, from effecting the transfer.    X____________________________________________ DATE 10/19/24        TIME_______      ORIGINAL - SEND TO MEDICAL RECORDS   COPY - SEND WITH PATIENT DURING TRANSFER

## 2024-10-17 NOTE — Clinical Note
Santiago ALBINO Monterroso should be transferred out to [facility] and has been medically cleared.

## 2024-10-17 NOTE — ED PROVIDER NOTES
Time reflects when diagnosis was documented in both MDM as applicable and the Disposition within this note       Time User Action Codes Description Comment    10/17/2024  1:42 PM Gita Arthur Add [R46.89] Abnormal behavior           ED Disposition       None          Assessment & Plan       Medical Decision Making  Patient is a 44-year-old male past medical history of hypertension presenting for psychiatric evaluation.  Patient is well-appearing at bedside though currently hypertensive but in no acute distress and has no complaints other than diffuse bodyaches which he states are from hiking for the last several days.  Patient is oriented and appears to offer coherent and linear narrative as to his whereabouts for the last 2 days however per nursing police stated that patient was not on a retreat but rather had gone into the woods to use meth for 3 days.  Police had been concerned enough to pursue 302 however as patient stated that he would sign himself in as he was a gail and did not want a lose his guns they did not pursue it.  At this time patient does appear oriented, nonpsychotic with no SI or HI and do not feel that he meets 302 criteria however will consult psychiatry for further evaluation.    Amount and/or Complexity of Data Reviewed  Labs: ordered.        ED Course as of 10/21/24 2027   Thu Oct 17, 2024   1728 Psychiatry has evaluated the patient recommending inpatient treatment, treatment for benzodiazepine and alcohol withdrawal.  Will obtain labs as patient does note extreme physical activity and diffuse body aches and will medicate as needed and offer 201 but if unwilling will place patient under 302.   1825 Have discussed further with psychiatry as patient is showing minimal if any signs of alcohol withdrawal separate from psychosis and have discussed with toxicology who does not feel that patient requires detox unit based on lack of objective symptoms.  Psychiatry recommending Zyprexa 5 mg  daily and inpatient psychiatric treatment   1833 Psychiatry recommending Zyprexa 5 mg daily with an additional 5 to 10 mg IM every 6 hours as needed not to exceed 30 mg for psychosis.       Medications - No data to display    ED Risk Strat Scores                                               History of Present Illness       Chief Complaint   Patient presents with   • Psychiatric Evaluation     Patient arrives with EMS and PSP with reports of being out in the woods without a shirt for 24-48 hours. Patient reports being at a retreat for drug and alcohol detox and states he went on a hike. At some point a missing persons report was filed. Pt was found, and PSP is seeking to petition a 302.        Past Medical History:   Diagnosis Date   • Hypertension       History reviewed. No pertinent surgical history.   History reviewed. No pertinent family history.   Social History     Tobacco Use   • Smoking status: Never   • Smokeless tobacco: Never   Vaping Use   • Vaping status: Never Used   Substance Use Topics   • Alcohol use: Never   • Drug use: Yes     Types: Methamphetamines      E-Cigarette/Vaping   • E-Cigarette Use Never User       E-Cigarette/Vaping Substances      I have reviewed and agree with the history as documented.     Patient is a 44-year-old male past medical history of hypertension presenting for psychiatric evaluation.  Patient states that he went on a [employer] retreat as part of a drug and alcohol rehabilitation program and that the purpose was to climb a mountain, sleep outside, and climb back down.  He states that upon exiting the woods he forgot to call back into his base center and took a wrong turn.  He states that when he was able to fully emerge from the wooded area that state troopers were looking for him and stated that a missing persons report has been filed.  He denies any current drug or alcohol use and states that he has not used crystal meth in 5 days which was his prior drug of  choice.  Denies any SI, HI, auditory or visual hallucinations.  He denies any complains of chest pain, abdominal pain, shortness breath, nausea or vomiting and states he was dizzy but that has since resolved.  Notes diffuse bodyaches from hiking.  Patient refusing any labs or fluids and have discussed risks of missing electrolyte abnormalities, URIAH, and he states he understands.        Review of Systems   All other systems reviewed and are negative.          Objective       ED Triage Vitals [10/17/24 1325]   Temperature Pulse Blood Pressure Respirations SpO2 Patient Position - Orthostatic VS   97.7 °F (36.5 °C) 92 (!) 175/95 18 100 % Sitting      Temp Source Heart Rate Source BP Location FiO2 (%) Pain Score    Oral Monitor Right arm -- --      Vitals      Date and Time Temp Pulse SpO2 Resp BP Pain Score FACES Pain Rating User   10/17/24 1325 97.7 °F (36.5 °C) 92 100 % 18 175/95 -- -- VMW            Physical Exam  Vitals reviewed.   Constitutional:       General: He is not in acute distress.     Appearance: Normal appearance. He is not ill-appearing.   HENT:      Mouth/Throat:      Mouth: Mucous membranes are moist.   Eyes:      Extraocular Movements: Extraocular movements intact.      Comments: Conjunctival injection to left eye   Cardiovascular:      Rate and Rhythm: Normal rate and regular rhythm.      Heart sounds: Normal heart sounds.   Pulmonary:      Effort: Pulmonary effort is normal.      Breath sounds: Normal breath sounds.   Abdominal:      General: Abdomen is flat.      Palpations: Abdomen is soft.      Tenderness: There is no abdominal tenderness.   Musculoskeletal:         General: No swelling. Normal range of motion.      Cervical back: Neck supple.   Skin:     General: Skin is warm and dry.   Neurological:      General: No focal deficit present.      Mental Status: He is alert and oriented to person, place, and time.      Motor: No weakness.   Psychiatric:         Mood and Affect: Mood normal.          Results Reviewed       None            No orders to display       ECG 12 Lead Documentation Only    Date/Time: 10/17/2024 1:51 PM    Performed by: Gita Arthur DO  Authorized by: Gita Arthur DO    Patient location:  ED  Previous ECG:     Previous ECG:  Compared to current    Comparison ECG info:  Nonspecific ST abnormality in the lateral leads  Interpretation:     Interpretation: non-specific    Rate:     ECG rate assessment: normal    Rhythm:     Rhythm: sinus rhythm    Ectopy:     Ectopy: none    QRS:     QRS axis:  Normal    QRS intervals:  Normal  Conduction:     Conduction: normal    ST segments:     ST segments:  Normal  T waves:     T waves: non-specific        ED Medication and Procedure Management   None     Patient's Medications    No medications on file     No discharge procedures on file.  ED SEPSIS DOCUMENTATION   Time reflects when diagnosis was documented in both MDM as applicable and the Disposition within this note       Time User Action Codes Description Comment    10/17/2024  1:42 PM Gita Arthur Add [R46.89] Abnormal behavior                  Gita Arthru DO  10/21/24 2027

## 2024-10-17 NOTE — ED NOTES
"Patient expressed eagerness to leave; patient states that he wants food and a shower. Patient informed that we can get him those things and he said \"NO I want HOME food\". Patient informed that provider would be in to speak with him, but he would not be met for discharge until psychiatry sees him. Patient informed him again that he would be able to get food here and he again said that he only wants \"home food\".      Mariam Mccallum  10/17/24 1552    "

## 2024-10-17 NOTE — PROGRESS NOTES
Psychiatry consult    Psychiatry consult request received.  Awaiting documentation regarding reason for emergency department visit and psychiatry consult request.

## 2024-10-17 NOTE — ED NOTES
"Santiago Monterroso is a 43 y/o male, diagnosed with no known diagnoses who presented to ED via police car due to:      Psychiatric Evaluation       Patient arrives with EMS and PSP with reports of being out in the woods without a shirt for 24-48 hours. Patient reports being at a retreat for drug and alcohol detox and states he went on a hike. At some point a missing persons report was filed. Pt was found, and PSP is seeking to petition a    Pt appears guarded. Oriented to self, day, year. Pt presents with minimal eye contact.  Pt states that a couple of days ago he decided to take a hike in the forest and walked 50 plus miles. Pt states he found himself shirtless, and most likely have frost bite on his foot. Pt states that 2 days ago he took \"hallucinotic drug\" and that he does not remember much since then. Pt states his wife was aware where he was but contacted 911 and reported him as a missing person. At some point today while pt was walking state police stopped to talk to pt and brought him in to ED for evaluation.   Pt denies SI, HI, self harming. Pt denies current hallucinations. No past or present mental health services reported. No appetite or sleep problems reported. Pt would like to be discharge home at this time.    "

## 2024-10-18 LAB
ALBUMIN SERPL BCG-MCNC: 3.6 G/DL (ref 3.5–5)
ALP SERPL-CCNC: 34 U/L (ref 34–104)
ALT SERPL W P-5'-P-CCNC: 28 U/L (ref 7–52)
ANION GAP SERPL CALCULATED.3IONS-SCNC: 8 MMOL/L (ref 4–13)
AST SERPL W P-5'-P-CCNC: 52 U/L (ref 13–39)
BILIRUB SERPL-MCNC: 1.63 MG/DL (ref 0.2–1)
BUN SERPL-MCNC: 26 MG/DL (ref 5–25)
CALCIUM SERPL-MCNC: 7.9 MG/DL (ref 8.4–10.2)
CHLORIDE SERPL-SCNC: 104 MMOL/L (ref 96–108)
CK SERPL-CCNC: 1595 U/L (ref 39–308)
CO2 SERPL-SCNC: 22 MMOL/L (ref 21–32)
CREAT SERPL-MCNC: 1.16 MG/DL (ref 0.6–1.3)
GFR SERPL CREATININE-BSD FRML MDRD: 76 ML/MIN/1.73SQ M
GLUCOSE P FAST SERPL-MCNC: 97 MG/DL (ref 65–99)
GLUCOSE SERPL-MCNC: 97 MG/DL (ref 65–140)
POTASSIUM SERPL-SCNC: 3.5 MMOL/L (ref 3.5–5.3)
PROT SERPL-MCNC: 5.9 G/DL (ref 6.4–8.4)
SODIUM SERPL-SCNC: 134 MMOL/L (ref 135–147)

## 2024-10-18 PROCEDURE — 99233 SBSQ HOSP IP/OBS HIGH 50: CPT | Performed by: STUDENT IN AN ORGANIZED HEALTH CARE EDUCATION/TRAINING PROGRAM

## 2024-10-18 PROCEDURE — 80053 COMPREHEN METABOLIC PANEL: CPT | Performed by: PHYSICIAN ASSISTANT

## 2024-10-18 PROCEDURE — 82550 ASSAY OF CK (CPK): CPT | Performed by: PHYSICIAN ASSISTANT

## 2024-10-18 RX ADMIN — Medication 6 MG: at 21:24

## 2024-10-18 RX ADMIN — SODIUM CHLORIDE 150 ML/HR: 0.9 INJECTION, SOLUTION INTRAVENOUS at 23:55

## 2024-10-18 RX ADMIN — THIAMINE HCL TAB 100 MG 100 MG: 100 TAB at 08:29

## 2024-10-18 RX ADMIN — Medication 1 TABLET: at 08:29

## 2024-10-18 RX ADMIN — SODIUM CHLORIDE 150 ML/HR: 0.9 INJECTION, SOLUTION INTRAVENOUS at 17:45

## 2024-10-18 RX ADMIN — FOLIC ACID 1 MG: 1 TABLET ORAL at 08:29

## 2024-10-18 NOTE — TREATMENT PLAN
Patient was seen and evaluated at bedside. Fiance is present. URIAH has resolved and CK is downtrending. He is cleared for transfer to inpatient psychiatric facility transfer once once bed is found. Progress note for today to follow.

## 2024-10-18 NOTE — ASSESSMENT & PLAN NOTE
Meeting criteria due to tachycardia and tachypnea and leukocytosis, most likely secondary to possible withdrawal and leukocytosis secondary to rhabdomyolysis  No clear infectious source at this time so we will hold off on antibiotics  Given IV fluid bolus 30 mg/kg in the ED, continue aggressive IV fluid hydration  Check blood culture x 2, lactic acid, procalcitonin

## 2024-10-18 NOTE — ASSESSMENT & PLAN NOTE
Meeting criteria due to tachycardia and tachypnea and leukocytosis  This is in the setting of being out in the woods for almost 48 hours without food or water  Concern for infection at this time.  No antibiotics administered the patient is doing well this morning

## 2024-10-18 NOTE — ASSESSMENT & PLAN NOTE
Secondary to rhabdomyolysis and dehydration, elevated gap and low bicarb  Continue aggressive IV fluid hydration  Monitor CMP

## 2024-10-18 NOTE — ASSESSMENT & PLAN NOTE
CK 2968, and total bilirubin elevated 2.23 which is new; was found in the woods, unknown but he may have been out there for 24 to 48 hours  We will provide with aggressive IV fluid hydration  Monitor CK

## 2024-10-18 NOTE — CASE MANAGEMENT
Case Management Progress Note    Patient name Santiago Monterroso  Location /-01 MRN [id number]  : 1980 Date 10/18/2024       LOS (days): 0  Geometric Mean LOS (GMLOS) (days):   Days to GMLOS:        OBJECTIVE:        Current admission status: Observation  Preferred Pharmacy:   [id number] - [place] - [address]  [address]  [place] [address]  Phone: [phone] Fax: [phone]    Primary Care Provider: No primary care provider on file.    Primary Insurance: BLUE CROSS  Secondary Insurance:     PROGRESS NOTE:  Discussed patient's case in interdisciplinary rounds this morning with SLIM provider, RN, and Crisis Worker. Patient is medically cleared for discharge- bed search can commence for IP BH. CW to speak with patient regarding 201. Anticipating discharge once bed is found at a facility. CM will continue to follow.

## 2024-10-18 NOTE — QUICK NOTE
"Patent and his significant other in the room. RN asked to describe the circumstances of patents situation. Patient stated he does not want to go to the psych unit and that he will \"deal with the issue\" on his own. SLIM and case management aware. Patient still on virtual 1:1, compliment and calm, willing to communicate with the nurse and staff.  "

## 2024-10-18 NOTE — ED NOTES
MERY received a call from Pravin from [facility] stating that they got the clinicals and that they might be able to accept pt. They need to speak with him, provided him with pts phone number. Pravin stated that they do have beds. Pravin would call when he is done reviewing

## 2024-10-18 NOTE — ASSESSMENT & PLAN NOTE
"Patient admitting he uses alcohol \"very often.\"  Unable to quantify how much or what he usually drinks  Breathalyzer test 0.0  Placed on CIWA protocol  Daily folic acid, thiamine, multivitamin  When patient more coherent continue to encourage drug and alcohol rehab  "

## 2024-10-18 NOTE — ASSESSMENT & PLAN NOTE
Patient found in the woods by police with noted visual hallucinations, upon arrival to ED noted tactile hallucinations thinking water is dripping on him  Pt reported that he last used intranasal methamphetamine on Sunday and then had a spiritual awakening that lead him to be out in the woods praying for 48 hrs to try and find God   Psychiatry consulted in ED, they recommended transfer to inpatient psychiatric facility once stable from medical perspective  Patient may not leave AMA  Patient signed 201 today and bed-search is underway  As needed IM Zyprexa for any severe psychosis or agitation

## 2024-10-18 NOTE — ASSESSMENT & PLAN NOTE
Currently on assessment patient is alert and oriented x 2 to person, time.  Disoriented to place, situation and does not appear internally preoccupied  Obtain CT head  Virtual one-to-one for safety  Provide with frequent verbal redirection, melatonin for promoting sleep hygiene  CIWA protocol  Attempt to avoid narcotics

## 2024-10-18 NOTE — UTILIZATION REVIEW
"Initial Clinical Review    Admission: Date/Time/Statement:   Admission Orders (From admission, onward)       Ordered        10/17/24 2031  Place in Observation  Once                          Orders Placed This Encounter   Procedures    Place in Observation     Standing Status:   Standing     Number of Occurrences:   1     Order Specific Question:   Level of Care     Answer:   Med Surg [16]     ED Arrival Information       Expected   -    Arrival   10/17/2024 13:17    Acuity   Emergent              Means of arrival   Ambulance    Escorted by   [facility] [place]ospitalist    Admission type   Emergency              Arrival complaint   Psychiatric Evaluation             Chief Complaint   Patient presents with    Psychiatric Evaluation     Patient arrives with EMS and PSP with reports of being out in the woods without a shirt for 24-48 hours. Patient reports being at a retreat for drug and alcohol detox and states he went on a hike. At some point a missing persons report was filed. Pt was found, and PSP is seeking to petition a 302.        Initial Presentation: 44 y.o. male who presented by EMS to [facility] ED. Admitted as Observation for evaluation and treatment of Rhabdomyolysis, URIAH, SIRS, Hyponatremia, Metabolic Encephalopathy, Psychosis.      PMHx:  has a past medical history of Hypertension.      Presented w/ complaint of tactile and possibly visual hallucinations. [place] [employer] reports pt found out in the woods without a shirt, unknown how long he was out there, possibly 24-48hrs. Pt reports was at a drug and alcohol rehab and went for a hike and got lost. Reports he feels like wather is dripping on him and they are not.Pt admits drinking alcohol \"very often\", unable to quantify. Breathalyzer test 0.0.   On exam, pt disheveled and oriented x2 , person and time only. Abnormal Labs Na 131, Bun/Creat 35/1.61. total bili 2.23, Total CK 2,968, Procal 0.50, WBC 14.35. In ED, pt received IV " "Lorazepam, IV Thiamine, IV folic acid. IV NSS bolus 3 L.     Plan: Continue aggressive IV fluids, Hold off on abx at this time. Check blood culture x2, Avoid nephrotoxic agents, Daily folic acid, Thiamine and Multivitamin.  Check UDS. Monitor CMP. CBC in am. Psychiatry and Case management consulted.    Psychiatry consult: Abnormal behavior. Pt w/ hx of crystal meth and reports drinks alcohol heavily, \"everything I can get my hands on until it is gone.\"  During assessment, mental status appears to be waxing and waning causing impairment of insight and judgment at this time. Recommend treat w/ benzodiazepine of choice for ETOH withdrawal. Continue w/ thiamine and folate supplement. Upon medical clearance, recommend return to inpatient drug/alcohol rehab. Pt should not be permitted to sign out AMA.     Date: 10/18/24   Day 2: Pt is cleared to go to inpatient drug/alcohol rehab facility . URIAH has resolved and CK is down trending. Abnormal labs: CK 1,595. Bun 26. Total bili 1.63. Per case management, pt is interested in a dual facility. Plan: Case management working on placement.       ED Treatment-Medication Administration from 10/17/2024 1317 to 10/17/2024 2151         Date/Time Order Dose Route Action     10/17/2024 1814 LORazepam (ATIVAN) injection 2 mg 2 mg Intramuscular Given     10/17/2024 1817 thiamine (VITAMIN B1) 100 mg in sodium chloride 0.9 % 50 mL IVPB 100 mg Intravenous New Bag     10/17/2024 1917 folic acid 1 mg in sodium chloride 0.9 % 50 mL IVPB 1 mg Intravenous New Bag     10/17/2024 1817 sodium chloride 0.9 % bolus 1,000 mL 1,000 mL Intravenous New Bag     10/17/2024 2012 sodium chloride 0.9 % bolus 2,000 mL 2,000 mL Intravenous New Bag            Scheduled Medications:  folic acid, 1 mg, Oral, Daily  melatonin, 6 mg, Oral, HS  multivitamin-minerals, 1 tablet, Oral, Daily  thiamine, 100 mg, Oral, Daily      Continuous IV Infusions:  sodium chloride, 150 mL/hr, Intravenous, Continuous      PRN " Meds:  acetaminophen, 650 mg, Oral, Q6H PRN  aluminum-magnesium hydroxide-simethicone, 30 mL, Oral, Q6H PRN  OLANZapine, 5 mg, Intramuscular, Q3H PRN  ondansetron, 4 mg, Intravenous, Q6H PRN      ED Triage Vitals   Temperature Pulse Respirations Blood Pressure SpO2 Pain Score   10/17/24 1325 10/17/24 1325 10/17/24 1325 10/17/24 1325 10/17/24 1325 10/17/24 2215   97.7 °F (36.5 °C) 92 18 (!) 175/95 100 % No Pain     Weight (last 2 days)       Date/Time Weight    10/17/24 2315 94.2 (207.67)    10/17/24 2042 94.2 (207.67)            Vital Signs (last 3 days)       Date/Time Temp Pulse Resp BP MAP (mmHg) SpO2 O2 Device Patient Position - Orthostatic VS CIWA-Ar Total Pain    10/18/24 1130 -- -- -- -- -- -- -- -- 2 --    10/18/24 0900 -- -- -- -- -- 98 % None (Room air) -- -- No Pain    10/18/24 07:36:44 98 °F (36.7 °C) 80 -- 141/83 102 98 % -- -- 2 --    10/18/24 0600 -- -- -- -- -- -- -- -- 1 --    10/18/24 0200 -- -- -- -- -- -- -- -- 1 --    10/17/24 2315 97.7 °F (36.5 °C) 91 18 104/69 -- -- -- -- -- --    10/17/24 2215 -- -- -- -- -- -- -- -- -- No Pain    10/17/24 2200 -- -- -- -- -- -- -- -- 2 --    10/17/24 21:54:10 -- 91 18 104/69 81 99 % -- -- -- --    10/17/24 2000 -- 99 19 138/65 92 -- -- -- -- --    10/17/24 1900 -- 106 32 126/76 95 -- -- -- -- --    10/17/24 1819 -- -- -- 140/75 -- -- -- -- -- --    10/17/24 1815 -- 102 19 140/75 -- 100 % None (Room air) Sitting -- --    10/17/24 1800 -- 100 -- 140/75 -- -- -- -- 6 --    10/17/24 1530 -- 101 17 175/95 -- 100 % None (Room air) Sitting -- --    10/17/24 1347 -- -- -- -- -- -- None (Room air) -- -- --    10/17/24 1325 97.7 °F (36.5 °C) 92 18 175/95 126 100 % None (Room air) Sitting 4 --           CIWA-Ar Score       Row Name 10/18/24 1130 10/18/24 07:36:44 10/18/24 0600       CIWA-Ar    Nausea and Vomiting 0 0 0    Tactile Disturbances 0 0 0    Tremor 1 1 1    Auditory Disturbances 0 0 0    Paroxysmal Sweats 0 0 0    Visual Disturbances 0 0 0  Pt verbalizes no  disturbances    Anxiety 1 0 0    Headache, Fullness in Head 0 0 0    Agitation 0 1 0    Orientation and Clouding of Sensorium 0 0 0    CIWA-Ar Total 2 2 1      Row Name 10/18/24 0200 10/17/24 2200 10/17/24 1800       CIWA-Ar    BP -- -- 140/75    Pulse -- -- 100    Nausea and Vomiting 0 0 0    Tactile Disturbances 0 0 4    Tremor 1 1 0    Auditory Disturbances 0 0 0    Paroxysmal Sweats 0 0 0    Visual Disturbances 0 0 0    Anxiety 0 1 1    Headache, Fullness in Head 0 0 0    Agitation 0 0 1    Orientation and Clouding of Sensorium 0 0 0    CIWA-Ar Total 1 2 6      Row Name 10/17/24 1325             CIWA-Ar    Nausea and Vomiting 0      Tactile Disturbances 2      Tremor 0      Auditory Disturbances 0      Paroxysmal Sweats 0      Visual Disturbances 0      Anxiety 1      Headache, Fullness in Head 0      Agitation 1      Orientation and Clouding of Sensorium 0      CIWA-Ar Total 4                      Pertinent Labs/Diagnostic Test Results:   Radiology:  CT head wo contrast   Final Interpretation by Rodrigo Squires MD (10/17 2105)      No acute intracranial abnormality.                  Workstation performed: [id number]               Results from last 7 days   Lab Units 10/17/24  2054   SARS-COV-2  Negative     Results from last 7 days   Lab Units 10/17/24  1810   WBC Thousand/uL 14.35*   HEMOGLOBIN g/dL 13.2   HEMATOCRIT % 40.1   PLATELETS Thousands/uL 248   TOTAL NEUT ABS Thousands/µL 11.25*         Results from last 7 days   Lab Units 10/18/24  0447 10/17/24  1810   SODIUM mmol/L 134* 131*   POTASSIUM mmol/L 3.5 3.9   CHLORIDE mmol/L 104 94*   CO2 mmol/L 22 17*   ANION GAP mmol/L 8 20*   BUN mg/dL 26* 35*   CREATININE mg/dL 1.16 1.61*   EGFR ml/min/1.73sq m 76 51   CALCIUM mg/dL 7.9* 9.7   MAGNESIUM mg/dL  --  2.1     Results from last 7 days   Lab Units 10/18/24  0447 10/17/24  1810   AST U/L 52* 74*   ALT U/L 28 38   ALK PHOS U/L 34 39   TOTAL PROTEIN g/dL 5.9* 7.8   ALBUMIN g/dL 3.6 4.7   TOTAL BILIRUBIN  mg/dL 1.63* 2.23*         Results from last 7 days   Lab Units 10/18/24  0447 10/17/24  1810   GLUCOSE RANDOM mg/dL 97 101       Results from last 7 days   Lab Units 10/18/24  0447 10/17/24  1810   CK TOTAL U/L 1,595* 2,968*             Results from last 7 days   Lab Units 10/17/24  1810   PROTIME seconds 13.0   INR  0.91   PTT seconds 28         Results from last 7 days   Lab Units 10/17/24  1810   PROCALCITONIN ng/ml 0.50*     Results from last 7 days   Lab Units 10/17/24  2118   LACTIC ACID mmol/L 0.6         Results from last 7 days   Lab Units 10/17/24  2054   INFLUENZA A PCR  Negative   INFLUENZA B PCR  Negative   RSV PCR  Negative             Results from last 7 days   Lab Units 10/17/24  2118   ETHANOL LVL mg/dL <10   ACETAMINOPHEN LVL ug/mL <2*   SALICYLATE LVL mg/dL <5                 Results from last 7 days   Lab Units 10/17/24  2118   BLOOD CULTURE  Received in Microbiology Lab. Culture in Progress.           Past Medical History:   Diagnosis Date    Hypertension      Present on Admission:  **None**      Admitting Diagnosis: Rhabdomyolysis [M62.82]  Psychosis (HCC) [F29]  Abnormal behavior [R46.89]  URIAH (acute kidney injury) ([facility]) [N17.9]  Encounter for psychological evaluation [Z00.8]  Age/Sex: 44 y.o. male    Network Utilization Review Department  ATTENTION: Please call with any questions or concerns to [phone] and carefully listen to the prompts so that you are directed to the right person. All voicemails are confidential.   For Discharge needs, contact Care Management DC Support Team at [phone] opt. 2  Send all requests for admission clinical reviews, approved or denied determinations and any other requests to dedicated fax number below belonging to the campus where the patient is receiving treatment. List of dedicated fax numbers for the Facilities:  FACILITY NAME UR FAX NUMBER   ADMISSION DENIALS (Administrative/Medical Necessity) [phone]   DISCHARGE SUPPORT TEAM (NETWORK)  [phone]   PARENT CHILD HEALTH (Maternity/NICU/Pediatrics) [phone]   [facility] [phone]   [facility] [phone]   [facility] [phone]   [facility] [phone]   [facility] [phone]   [facility] [phone]   [facility] [phone]   [facility] [phone]   [facility] [phone]   [facility] [phone]   [facility] [phone]   [facility] [phone]

## 2024-10-18 NOTE — ASSESSMENT & PLAN NOTE
Mild in the setting of drug use and being out in the woods  Received IV fluids with appropriate downtrending his CK levels  This point we can continue IV fluids while he is here and encourage oral intake on transfer   Kidney function is normal

## 2024-10-18 NOTE — ASSESSMENT & PLAN NOTE
Creatinine currently 1.61, baseline around 1 most likely secondary to dehydration, patient was in the woods for possibly 24 to 48 hours  Will provide with IV fluid hydration  Attempt to avoid nephrotoxic agents  Monitor CMP

## 2024-10-18 NOTE — PLAN OF CARE
Problem: SAFETY ADULT  Goal: Patient will remain free of falls  Description: INTERVENTIONS:  - Educate patient/family on patient safety including physical limitations  - Instruct patient to call for assistance with activity   - Consult OT/PT to assist with strengthening/mobility   - Keep Call bell within reach  - Keep bed low and locked with side rails adjusted as appropriate  - Keep care items and personal belongings within reach  - Initiate and maintain comfort rounds  - Make Fall Risk Sign visible to staff  - Offer Toileting every 2 Hours, in advance of need  - Initiate/Maintain bed alarm  - Obtain necessary fall risk management equipment: bed alarm   - Apply yellow socks and bracelet for high fall risk patients  - Consider moving patient to room near nurses station  Outcome: Progressing  Goal: Maintain or return to baseline ADL function  Description: INTERVENTIONS:  -  Assess patient's ability to carry out ADLs; assess patient's baseline for ADL function and identify physical deficits which impact ability to perform ADLs (bathing, care of mouth/teeth, toileting, grooming, dressing, etc.)  - Assess/evaluate cause of self-care deficits   - Assess range of motion  - Assess patient's mobility; develop plan if impaired  - Assess patient's need for assistive devices and provide as appropriate  - Encourage maximum independence but intervene and supervise when necessary  - Involve family in performance of ADLs  - Assess for home care needs following discharge   - Consider OT consult to assist with ADL evaluation and planning for discharge  - Provide patient education as appropriate  Outcome: Progressing  Goal: Maintains/Returns to pre admission functional level  Description: INTERVENTIONS:  - Perform AM-PAC 6 Click Basic Mobility/ Daily Activity assessment daily.  - Set and communicate daily mobility goal to care team and patient/family/caregiver.   - Collaborate with rehabilitation services on mobility goals if  consulted  - Perform Range of Motion 3 times a day.  - Reposition patient every 3 hours.  - Dangle patient 3 times a day  - Stand patient 3 times a day  - Ambulate patient 3 times a day  - Out of bed to chair 3 times a day   - Out of bed for meals 3 times a day  - Out of bed for toileting  - Record patient progress and toleration of activity level   Outcome: Progressing     Problem: DISCHARGE PLANNING  Goal: Discharge to home or other facility with appropriate resources  Description: INTERVENTIONS:  - Identify barriers to discharge w/patient and caregiver  - Arrange for needed discharge resources and transportation as appropriate  - Identify discharge learning needs (meds, wound care, etc.)  - Arrange for interpretive services to assist at discharge as needed  - Refer to Case Management Department for coordinating discharge planning if the patient needs post-hospital services based on physician/advanced practitioner order or complex needs related to functional status, cognitive ability, or social support system  Outcome: Progressing     Problem: Knowledge Deficit  Goal: Patient/family/caregiver demonstrates understanding of disease process, treatment plan, medications, and discharge instructions  Description: Complete learning assessment and assess knowledge base.  Interventions:  - Provide teaching at level of understanding  - Provide teaching via preferred learning methods  Outcome: Progressing     Problem: PAIN - ADULT  Goal: Verbalizes/displays adequate comfort level or baseline comfort level  Description: Interventions:  - Encourage patient to monitor pain and request assistance  - Assess pain using appropriate pain scale  - Administer analgesics based on type and severity of pain and evaluate response  - Implement non-pharmacological measures as appropriate and evaluate response  - Consider cultural and social influences on pain and pain management  - Notify physician/advanced practitioner if interventions  unsuccessful or patient reports new pain  Outcome: Progressing     Problem: INFECTION - ADULT  Goal: Absence or prevention of progression during hospitalization  Description: INTERVENTIONS:  - Assess and monitor for signs and symptoms of infection  - Monitor lab/diagnostic results  - Monitor all insertion sites, i.e. indwelling lines, tubes, and drains  - Monitor endotracheal if appropriate and nasal secretions for changes in amount and color  - Trenary appropriate cooling/warming therapies per order  - Administer medications as ordered  - Instruct and encourage patient and family to use good hand hygiene technique  - Identify and instruct in appropriate isolation precautions for identified infection/condition  Outcome: Progressing  Goal: Absence of fever/infection during neutropenic period  Description: INTERVENTIONS:  - Monitor WBC    Outcome: Progressing

## 2024-10-18 NOTE — H&P
"H&P - Hospitalist   Name: Santiago Monterroso 44 y.o. male I MRN: [id number]  Unit/Bed#: ED 08 I Date of Admission: 10/17/2024   Date of Service: 10/17/2024 I Hospital Day: 0     Assessment & Plan  Rhabdomyolysis  CK 2968, and total bilirubin elevated 2.23 which is new; was found in the woods, unknown but he may have been out there for 24 to 48 hours  We will provide with aggressive IV fluid hydration  Monitor CK  URIAH (acute kidney injury) (HCC)  Creatinine currently 1.61, baseline around 1 most likely secondary to dehydration, patient was in the woods for possibly 24 to 48 hours  Will provide with IV fluid hydration  Attempt to avoid nephrotoxic agents  Monitor CMP  Psychosis (HCC)  Patient found in the woods by police with noted visual hallucinations, upon arrival to ED noted tactile hallucinations thinking water is dripping on him  Unclear if patient taking methamphetamine plus alcohol, patient's story keeps changing, could possibly be drug-induced psychosis  Check UDS, coma panel  Psychiatry consult performed in ED, patient signed 201 for inpatient psychiatry for once medically cleared  Patient at this time with poor insight and judgment, unable to sign out AMA  As needed IM Zyprexa for any severe psychosis or agitation  Alcohol use  Patient admitting he uses alcohol \"very often.\"  Unable to quantify how much or what he usually drinks  Breathalyzer test 0.0  Placed on CIWA protocol  Daily folic acid, thiamine, multivitamin  When patient more coherent continue to encourage drug and alcohol rehab  SIRS (systemic inflammatory response syndrome) (HCC)  Meeting criteria due to tachycardia and tachypnea and leukocytosis, most likely secondary to possible withdrawal and leukocytosis secondary to rhabdomyolysis  No clear infectious source at this time so we will hold off on antibiotics  Given IV fluid bolus 30 mg/kg in the ED, continue aggressive IV fluid hydration  Check blood culture x 2, lactic acid, " procalcitonin  Hyponatremia  Sodium 131  IV fluid hydration with normal saline  Recheck CMP in a.m.  Metabolic acidosis  Secondary to rhabdomyolysis and dehydration, elevated gap and low bicarb  Continue aggressive IV fluid hydration  Monitor CMP  Metabolic encephalopathy  Currently on assessment patient is alert and oriented x 2 to person, time.  Disoriented to place, situation and does not appear internally preoccupied  Obtain CT head  Virtual one-to-one for safety  Provide with frequent verbal redirection, melatonin for promoting sleep hygiene  CIWA protocol  Attempt to avoid narcotics      VTE Pharmacologic Prophylaxis: VTE Score: 2 Low Risk (Score 0-2) - Encourage Ambulation.  Code Status: Level 1 - Full Code   Discussion with family:  pt.     Anticipated Length of Stay: Patient will be admitted on an observation basis with an anticipated length of stay of less than 2 midnights secondary to see above.    History of Present Illness   Chief Complaint:    Chief Complaint   Patient presents with    Psychiatric Evaluation     Patient arrives with EMS and PSP with reports of being out in the woods without a shirt for 24-48 hours. Patient reports being at a retreat for drug and alcohol detox and states he went on a hike. At some point a missing persons report was filed. Pt was found, and PSP is seeking to petition a 302.         Santiago Monterroso is a 44 y.o. male with a PMH of hypertension, hyperlipidemia who presents with complaint of tactile and possibly visual hallucinations per ED provider note.  Per ED provider note information provided by [place] [employer] police which report patient was found out in the woods without a shirt, unknown how long patient was out there but could have been 24 to 48 hours.  Patient reporting he went for a hike and got lost and he was at a drug and alcohol rehab facility.  Reports he keeps feeling like water is dripping on him even though there is not.  201 filed for inpatient  "psychiatry.  Also per ED provider note patient most likely may have taken methamphetamines and using alcohol.  Upon approach patient is alert and oriented x 2 to person, time only.  Disoriented to place, situation and does not recall what brought him in.  Patient unintelligibly answering questions.  Reports he uses alcohol \"a lot.\"  However, unable to state what he usually drinks or how often he drinks.  Denies any other drug use.  Reports overall he feels okay right now.  Denies any pain including chest pain, shortness of breath, abdominal pain.  Denies anxiety.  Denies AH, VH, TH    Review of Systems   Constitutional:  Positive for fatigue.   Respiratory:  Negative for shortness of breath.    Cardiovascular:  Negative for chest pain.   Gastrointestinal:  Negative for abdominal pain.   Neurological:  Negative for headaches.   Psychiatric/Behavioral:  Negative for hallucinations. The patient is not nervous/anxious.        Historical Information   Past Medical History:   Diagnosis Date    Hypertension      History reviewed. No pertinent surgical history.  Social History     Tobacco Use    Smoking status: Never    Smokeless tobacco: Never   Vaping Use    Vaping status: Never Used   Substance and Sexual Activity    Alcohol use: Never    Drug use: Yes     Types: Methamphetamines    Sexual activity: Not on file     E-Cigarette/Vaping    E-Cigarette Use Never User      E-Cigarette/Vaping Substances     Family history non-contributory  Social History:  Marital Status: Single     Patient Pre-hospital Living Situation: Home  Patient Pre-hospital Level of Mobility: walks  Patient Pre-hospital Diet Restrictions: n/a    Meds/Allergies   I have reviewed her medications per review of chart.   Prior to Admission medications    Not on File     No Known Allergies    Objective :  Temp:  [97.7 °F (36.5 °C)] 97.7 °F (36.5 °C)  HR:  [] 99  BP: (126-175)/(65-95) 138/65  Resp:  [17-32] 19  SpO2:  [100 %] 100 %  O2 Device: None (Room " air)    Physical Exam  Vitals and nursing note reviewed.   Constitutional:       General: He is not in acute distress.     Appearance: He is not toxic-appearing.      Comments: Disheveled   HENT:      Head: Normocephalic and atraumatic.   Cardiovascular:      Rate and Rhythm: Normal rate and regular rhythm.   Pulmonary:      Effort: Pulmonary effort is normal. No respiratory distress.      Breath sounds: Normal breath sounds.   Abdominal:      General: Abdomen is flat. Bowel sounds are normal. There is no distension.      Palpations: Abdomen is soft.      Tenderness: There is no abdominal tenderness.   Musculoskeletal:      Right lower leg: No edema.      Left lower leg: No edema.   Skin:     General: Skin is warm and dry.   Neurological:      Mental Status: He is alert. He is disoriented.      Comments: Alert and oriented x 2 to person, time only   Psychiatric:         Mood and Affect: Mood normal.      Comments: Poor judgment, currently does not appear internally preoccupied          Lines/Drains:            Lab Results: I have reviewed the following results:  Results from last 7 days   Lab Units 10/17/24  1810   WBC Thousand/uL 14.35*   HEMOGLOBIN g/dL 13.2   HEMATOCRIT % 40.1   PLATELETS Thousands/uL 248   SEGS PCT % 79*   LYMPHO PCT % 10*   MONO PCT % 11   EOS PCT % 0     Results from last 7 days   Lab Units 10/17/24  1810   SODIUM mmol/L 131*   POTASSIUM mmol/L 3.9   CHLORIDE mmol/L 94*   CO2 mmol/L 17*   BUN mg/dL 35*   CREATININE mg/dL 1.61*   ANION GAP mmol/L 20*   CALCIUM mg/dL 9.7   ALBUMIN g/dL 4.7   TOTAL BILIRUBIN mg/dL 2.23*   ALK PHOS U/L 39   ALT U/L 38   AST U/L 74*   GLUCOSE RANDOM mg/dL 101     Results from last 7 days   Lab Units 10/17/24  1810   INR  0.91         Lab Results   Component Value Date    HGBA1C 5.4 04/20/2021           Imaging Results Review: No pertinent imaging studies reviewed.  Other Study Results Review: EKG was personally reviewed and my interpretation is: Personally  Reviewed. NSR..    Administrative Statements   I have spent a total time of 76 minutes in caring for this patient on the day of the visit/encounter including Diagnostic results, Instructions for management, Patient and family education, Counseling / Coordination of care, Documenting in the medical record, Reviewing / ordering tests, medicine, procedures  , and Obtaining or reviewing history  .    ** Please Note: This note has been constructed using a voice recognition system. **

## 2024-10-18 NOTE — PROGRESS NOTES
Progress Note - Hospitalist   Name: Santiago Monterroso 44 y.o. male I MRN: [id number]  Unit/Bed#: -01 I Date of Admission: 10/17/2024   Date of Service: 10/18/2024 I Hospital Day: 0    Assessment & Plan  Rhabdomyolysis  Mild in the setting of drug use and being out in the woods  Received IV fluids with appropriate downtrending his CK levels  This point we can continue IV fluids while he is here and encourage oral intake on transfer   Kidney function is normal   URIAH (acute kidney injury) ([facility])  Cr normalized with fluids   He says he was out in the woods for 48 hrs without food or water   Psychosis ([facility])  Patient found in the woods by police with noted visual hallucinations, upon arrival to ED noted tactile hallucinations thinking water is dripping on him  Pt reported that he last used intranasal methamphetamine on Sunday and then had a spiritual awakening that lead him to be out in the woods praying for 48 hrs to try and find God   Psychiatry consulted in ED, they recommended transfer to inpatient psychiatric facility once stable from medical perspective  Patient may not leave AMA  Patient signed 201 today and bed-search is underway  As needed IM Zyprexa for any severe psychosis or agitation  Alcohol use  Patient denied any significant etoh use to me.   Ethanol level was negative  Placed on CIWA protocol initially -however very low concern for alcohol withdrawal at this point  Daily folic acid, thiamine, multivitamin was started   SIRS (systemic inflammatory response syndrome) (HCC)  Meeting criteria due to tachycardia and tachypnea and leukocytosis  This is in the setting of being out in the woods for almost 48 hours without food or water  Concern for infection at this time.  No antibiotics administered the patient is doing well this morning    Hyponatremia  Sodium 131  In the setting of hypovolemia  IV fluid hydration with normal saline  Improving to 134 today   Metabolic acidosis  Resolved with IVF  Metabolic  encephalopathy  Resolved and patient aaox3 at this time    VTE Pharmacologic Prophylaxis: VTE Score: 2 Low Risk (Score 0-2) - Encourage Ambulation.    Mobility:   Basic Mobility Inpatient Raw Score: 24  JH-HLM Goal: 8: Walk 250 feet or more  JH-HLM Achieved: 7: Walk 25 feet or more  JH-HLM Goal achieved. Continue to encourage appropriate mobility.    Patient Centered Rounds: I performed bedside rounds with nursing staff today.   Discussions with Specialists or Other Care Team Provider: none    Education and Discussions with Family / Patient: Updated  (significant other) at bedside.    Current Length of Stay: 0 day(s)  Current Patient Status: Observation   Certification Statement: The patient will continue to require additional inpatient hospital stay due to awaiting transfer to in psychiatric unit  Discharge Plan: Anticipate discharge in 24-48 hrs to inpatient psych.    Code Status: Level 1 - Full Code    Subjective   This morning patient told me that he was coming from a methamphetamine high on Sunday into Monday.  He did not feel like himself and needed to go into the woods on Wednesday to have a spiritual awakening.  He was trying to find God.  He was praying in the woods and trying to find his way home. He is able to recount the story to me this morning. Denied any other complaints. Had some foot pain and that is improved.  No chest pain, shortness of breath, fevers, chills.  Feels much better now. No si/hi. His significant other was present at bedside    Objective :  Temp:  [97.4 °F (36.3 °C)-98 °F (36.7 °C)] 97.4 °F (36.3 °C)  HR:  [] 80  BP: (104-147)/(65-94) 147/94  Resp:  [18-32] 18  SpO2:  [98 %-100 %] 98 %  O2 Device: None (Room air)    Body mass index is 30.67 kg/m².     Input and Output Summary (last 24 hours):     Intake/Output Summary (Last 24 hours) at 10/18/2024 1622  Last data filed at 10/18/2024 0901  Gross per 24 hour   Intake 1350 ml   Output 150 ml   Net 1200 ml        Physical Exam  NAD  Nonlabored resp RA  RRR  NTND abd   Aaox3  Appropriate mood and affect. No overt hallucinations. Speech coherent and non-tangential.   Bilateral feet examined and no signs of frost bite noted     Lines/Drains:              Lab Results: I have reviewed the following results:   Results from last 7 days   Lab Units 10/17/24  1810   WBC Thousand/uL 14.35*   HEMOGLOBIN g/dL 13.2   HEMATOCRIT % 40.1   PLATELETS Thousands/uL 248   SEGS PCT % 79*   LYMPHO PCT % 10*   MONO PCT % 11   EOS PCT % 0     Results from last 7 days   Lab Units 10/18/24  0447   SODIUM mmol/L 134*   POTASSIUM mmol/L 3.5   CHLORIDE mmol/L 104   CO2 mmol/L 22   BUN mg/dL 26*   CREATININE mg/dL 1.16   ANION GAP mmol/L 8   CALCIUM mg/dL 7.9*   ALBUMIN g/dL 3.6   TOTAL BILIRUBIN mg/dL 1.63*   ALK PHOS U/L 34   ALT U/L 28   AST U/L 52*   GLUCOSE RANDOM mg/dL 97     Results from last 7 days   Lab Units 10/17/24  1810   INR  0.91             Results from last 7 days   Lab Units 10/17/24  2118 10/17/24  1810   LACTIC ACID mmol/L 0.6  --    PROCALCITONIN ng/ml  --  0.50*       Recent Cultures (last 7 days):   Results from last 7 days   Lab Units 10/17/24  2118   BLOOD CULTURE  Received in Microbiology Lab. Culture in Progress.       Imaging Results Review: I reviewed radiology reports from this admission including: CT head.  Other Study Results Review: No additional pertinent studies reviewed.    Last 24 Hours Medication List:     Current Facility-Administered Medications:     acetaminophen (TYLENOL) tablet 650 mg, Q6H PRN    aluminum-magnesium hydroxide-simethicone (MAALOX) oral suspension 30 mL, Q6H PRN    folic acid (FOLVITE) tablet 1 mg, Daily    melatonin tablet 6 mg, HS    multivitamin-minerals (CENTRUM) tablet 1 tablet, Daily    OLANZapine (ZyPREXA) IM injection 5 mg, Q3H PRN    ondansetron (ZOFRAN) injection 4 mg, Q6H PRN    sodium chloride 0.9 % infusion, Continuous, Last Rate: 150 mL/hr (10/17/24 2230)    thiamine tablet 100  mg, Daily    Administrative Statements   Today, Patient Was Seen By: Karthikeyan Jaquez MD  I have spent a total time of 50+ minutes in caring for this patient on the day of the visit/encounter including Diagnostic results, Risks and benefits of tx options, Instructions for management, and Patient and family education.    **Please Note: This note may have been constructed using a voice recognition system.**

## 2024-10-18 NOTE — PLAN OF CARE
Problem: NEUROSENSORY - ADULT  Goal: Achieves stable or improved neurological status  Description: INTERVENTIONS  - Monitor and report changes in neurological status  - Monitor vital signs such as temperature, blood pressure, glucose, and any other labs ordered   - Initiate measures to prevent increased intracranial pressure  - Monitor for seizure activity and implement precautions if appropriate      Outcome: Progressing     Problem: NEUROSENSORY - ADULT  Goal: Remains free of injury related to seizures activity  Description: INTERVENTIONS  - Maintain airway, patient safety  and administer oxygen as ordered  - Monitor patient for seizure activity, document and report duration and description of seizure to physician/advanced practitioner  - If seizure occurs,  ensure patient safety during seizure  - Reorient patient post seizure  - Seizure pads on all 4 side rails  - Instruct patient/family to notify RN of any seizure activity including if an aura is experienced  - Instruct patient/family to call for assistance with activity based on nursing assessment  - Administer anti-seizure medications if ordered    Outcome: Progressing

## 2024-10-18 NOTE — ASSESSMENT & PLAN NOTE
Patient denied any significant etoh use to me.   Ethanol level was negative  Placed on CIWA protocol initially -however very low concern for alcohol withdrawal at this point  Daily folic acid, thiamine, multivitamin was started

## 2024-10-18 NOTE — ASSESSMENT & PLAN NOTE
Patient found in the woods by police with noted visual hallucinations, upon arrival to ED noted tactile hallucinations thinking water is dripping on him  Unclear if patient taking methamphetamine plus alcohol, patient's story keeps changing, could possibly be drug-induced psychosis  Check UDS, coma panel  Psychiatry consult performed in ED, patient signed 201 for inpatient psychiatry for once medically cleared  Patient at this time with poor insight and judgment, unable to sign out AMA  As needed IM Zyprexa for any severe psychosis or agitation

## 2024-10-18 NOTE — NURSING NOTE
Mother- Gayla Monterroso [phone] ([place] in another state)    Mother called (not listed on chart but Patient gave this nurse verbal consent to talk to her on the phone about his care)     Mother requested that staff call his daughter Felicia as she is his support here and will confirm this story.     Mother reporting that the patient did not come from a rehab as he stated, she said that he went to his friends to buy drugs and from there he disappeared for 2 days. She says that they has search teams out looking for him when they found him in the woods.     Mother concerned that the patients female (Henry)  friend/significant other? Is his enabler and will continue to talk him out of going to a rehab as she has done previously (per mother).    Henry is currently bedside helping the patient get cleaned up for the day, bedside RN notified by this nurse about the mothers statements.

## 2024-10-18 NOTE — ASSESSMENT & PLAN NOTE
Sodium 131  In the setting of hypovolemia  IV fluid hydration with normal saline  Improving to 134 today

## 2024-10-18 NOTE — ED NOTES
CW was able to meet with the pt to obtain a signature for the 201. 201 explained to pt. PT stated that he would like to go to a dual facility named [facility]. Only wants this facility at this time. PT stated that he was in the talks with someone there and they stated that they don't have beds at this time. CW called [facility] to inquire if they accept 201s as admissions was not sure but able to send clinicals for review as there primary is substance abuse and secondary is mental health. CW asked admin if about to send clinical as he stated that would be okay if the facility would like to take him, it just wont be a secure 201. But able to send it and let the facility make that decision.     CW made pt aware that clinicals were sent for review. PT made aware that if they do not accept is 201 he would need another facility. PT understood.

## 2024-10-19 ENCOUNTER — HOSPITAL ENCOUNTER (INPATIENT)
Facility: HOSPITAL | Age: 44
LOS: 3 days | Discharge: OTHER INSTITUTION | DRG: 885 | End: 2024-10-22
Attending: HOSPITALIST | Admitting: PSYCHIATRY & NEUROLOGY
Payer: COMMERCIAL

## 2024-10-19 VITALS
BODY MASS INDEX: 30.76 KG/M2 | HEART RATE: 78 BPM | TEMPERATURE: 98.1 F | WEIGHT: 207.67 LBS | DIASTOLIC BLOOD PRESSURE: 98 MMHG | HEIGHT: 69 IN | RESPIRATION RATE: 17 BRPM | OXYGEN SATURATION: 97 % | SYSTOLIC BLOOD PRESSURE: 163 MMHG

## 2024-10-19 DIAGNOSIS — F17.200 NICOTINE ADDICTION: Primary | ICD-10-CM

## 2024-10-19 DIAGNOSIS — Z00.8 MEDICAL CLEARANCE FOR PSYCHIATRIC ADMISSION: ICD-10-CM

## 2024-10-19 DIAGNOSIS — G47.00 INSOMNIA: ICD-10-CM

## 2024-10-19 DIAGNOSIS — E55.9 VITAMIN D DEFICIENCY: ICD-10-CM

## 2024-10-19 DIAGNOSIS — I10 HYPERTENSION: ICD-10-CM

## 2024-10-19 DIAGNOSIS — F10.10 ALCOHOL ABUSE: ICD-10-CM

## 2024-10-19 DIAGNOSIS — M25.50 ARTHRALGIA: ICD-10-CM

## 2024-10-19 LAB
ALBUMIN SERPL BCG-MCNC: 3.2 G/DL (ref 3.5–5)
ALP SERPL-CCNC: 30 U/L (ref 34–104)
ALT SERPL W P-5'-P-CCNC: 35 U/L (ref 7–52)
ANION GAP SERPL CALCULATED.3IONS-SCNC: 6 MMOL/L (ref 4–13)
AST SERPL W P-5'-P-CCNC: 43 U/L (ref 13–39)
BASOPHILS # BLD AUTO: 0.02 THOUSANDS/ΜL (ref 0–0.1)
BASOPHILS NFR BLD AUTO: 0 % (ref 0–1)
BILIRUB SERPL-MCNC: 0.82 MG/DL (ref 0.2–1)
BUN SERPL-MCNC: 18 MG/DL (ref 5–25)
CALCIUM ALBUM COR SERPL-MCNC: 8.4 MG/DL (ref 8.3–10.1)
CALCIUM SERPL-MCNC: 7.8 MG/DL (ref 8.4–10.2)
CHLORIDE SERPL-SCNC: 109 MMOL/L (ref 96–108)
CO2 SERPL-SCNC: 22 MMOL/L (ref 21–32)
CREAT SERPL-MCNC: 0.96 MG/DL (ref 0.6–1.3)
EOSINOPHIL # BLD AUTO: 0.12 THOUSAND/ΜL (ref 0–0.61)
EOSINOPHIL NFR BLD AUTO: 2 % (ref 0–6)
ERYTHROCYTE [DISTWIDTH] IN BLOOD BY AUTOMATED COUNT: 12.8 % (ref 11.6–15.1)
GFR SERPL CREATININE-BSD FRML MDRD: 95 ML/MIN/1.73SQ M
GLUCOSE P FAST SERPL-MCNC: 110 MG/DL (ref 65–99)
GLUCOSE SERPL-MCNC: 110 MG/DL (ref 65–140)
HCT VFR BLD AUTO: 30.9 % (ref 36.5–49.3)
HGB BLD-MCNC: 10.3 G/DL (ref 12–17)
IMM GRANULOCYTES # BLD AUTO: 0.02 THOUSAND/UL (ref 0–0.2)
IMM GRANULOCYTES NFR BLD AUTO: 0 % (ref 0–2)
LYMPHOCYTES # BLD AUTO: 1.01 THOUSANDS/ΜL (ref 0.6–4.47)
LYMPHOCYTES NFR BLD AUTO: 19 % (ref 14–44)
MAGNESIUM SERPL-MCNC: 2.1 MG/DL (ref 1.9–2.7)
MCH RBC QN AUTO: 32.9 PG (ref 26.8–34.3)
MCHC RBC AUTO-ENTMCNC: 33 G/DL (ref 31.4–37.4)
MCV RBC AUTO: 100 FL (ref 82–98)
MONOCYTES # BLD AUTO: 0.6 THOUSAND/ΜL (ref 0.17–1.22)
MONOCYTES NFR BLD AUTO: 11 % (ref 4–12)
NEUTROPHILS # BLD AUTO: 3.49 THOUSANDS/ΜL (ref 1.85–7.62)
NEUTS SEG NFR BLD AUTO: 68 % (ref 43–75)
NRBC BLD AUTO-RTO: 0 /100 WBCS
PLATELET # BLD AUTO: 151 THOUSANDS/UL (ref 149–390)
PMV BLD AUTO: 10.2 FL (ref 8.9–12.7)
POTASSIUM SERPL-SCNC: 3.7 MMOL/L (ref 3.5–5.3)
PROT SERPL-MCNC: 5.5 G/DL (ref 6.4–8.4)
RBC # BLD AUTO: 3.1 MILLION/UL (ref 3.88–5.62)
SODIUM SERPL-SCNC: 137 MMOL/L (ref 135–147)
WBC # BLD AUTO: 5.26 THOUSAND/UL (ref 4.31–10.16)

## 2024-10-19 PROCEDURE — 83735 ASSAY OF MAGNESIUM: CPT | Performed by: STUDENT IN AN ORGANIZED HEALTH CARE EDUCATION/TRAINING PROGRAM

## 2024-10-19 PROCEDURE — 93005 ELECTROCARDIOGRAM TRACING: CPT

## 2024-10-19 PROCEDURE — 80053 COMPREHEN METABOLIC PANEL: CPT | Performed by: STUDENT IN AN ORGANIZED HEALTH CARE EDUCATION/TRAINING PROGRAM

## 2024-10-19 PROCEDURE — 99239 HOSP IP/OBS DSCHRG MGMT >30: CPT | Performed by: STUDENT IN AN ORGANIZED HEALTH CARE EDUCATION/TRAINING PROGRAM

## 2024-10-19 PROCEDURE — 85025 COMPLETE CBC W/AUTO DIFF WBC: CPT | Performed by: STUDENT IN AN ORGANIZED HEALTH CARE EDUCATION/TRAINING PROGRAM

## 2024-10-19 RX ORDER — OLANZAPINE 2.5 MG/1
5 TABLET, FILM COATED ORAL
Status: CANCELLED | OUTPATIENT
Start: 2024-10-19

## 2024-10-19 RX ORDER — IBUPROFEN 600 MG/1
600 TABLET, FILM COATED ORAL EVERY 6 HOURS PRN
Status: DISCONTINUED | OUTPATIENT
Start: 2024-10-19 | End: 2024-10-22 | Stop reason: HOSPADM

## 2024-10-19 RX ORDER — AMLODIPINE BESYLATE 5 MG/1
5 TABLET ORAL DAILY
Status: CANCELLED | OUTPATIENT
Start: 2024-10-19

## 2024-10-19 RX ORDER — HYDROXYZINE HYDROCHLORIDE 25 MG/1
100 TABLET, FILM COATED ORAL
Status: CANCELLED | OUTPATIENT
Start: 2024-10-19

## 2024-10-19 RX ORDER — LANOLIN ALCOHOL/MO/W.PET/CERES
6 CREAM (GRAM) TOPICAL
Status: CANCELLED | OUTPATIENT
Start: 2024-10-19

## 2024-10-19 RX ORDER — OLANZAPINE 2.5 MG/1
2.5 TABLET, FILM COATED ORAL
Status: DISCONTINUED | OUTPATIENT
Start: 2024-10-19 | End: 2024-10-22 | Stop reason: HOSPADM

## 2024-10-19 RX ORDER — BENZTROPINE MESYLATE 1 MG/ML
1 INJECTION, SOLUTION INTRAMUSCULAR; INTRAVENOUS
Status: DISCONTINUED | OUTPATIENT
Start: 2024-10-19 | End: 2024-10-22 | Stop reason: HOSPADM

## 2024-10-19 RX ORDER — ACETAMINOPHEN 325 MG/1
650 TABLET ORAL EVERY 6 HOURS PRN
Start: 2024-10-19 | End: 2024-10-22

## 2024-10-19 RX ORDER — BENZTROPINE MESYLATE 1 MG/1
1 TABLET ORAL
Status: CANCELLED | OUTPATIENT
Start: 2024-10-19

## 2024-10-19 RX ORDER — LANOLIN ALCOHOL/MO/W.PET/CERES
100 CREAM (GRAM) TOPICAL DAILY
Status: CANCELLED | OUTPATIENT
Start: 2024-10-20

## 2024-10-19 RX ORDER — OLANZAPINE 2.5 MG/1
2.5 TABLET, FILM COATED ORAL
Status: CANCELLED | OUTPATIENT
Start: 2024-10-19

## 2024-10-19 RX ORDER — AMLODIPINE BESYLATE 5 MG/1
5 TABLET ORAL DAILY
Start: 2024-10-19 | End: 2024-10-22

## 2024-10-19 RX ORDER — OLANZAPINE 10 MG/2ML
2.5 INJECTION, POWDER, FOR SOLUTION INTRAMUSCULAR
Status: DISCONTINUED | OUTPATIENT
Start: 2024-10-19 | End: 2024-10-22 | Stop reason: HOSPADM

## 2024-10-19 RX ORDER — DIPHENHYDRAMINE HYDROCHLORIDE 50 MG/ML
50 INJECTION INTRAMUSCULAR; INTRAVENOUS EVERY 6 HOURS PRN
Status: DISCONTINUED | OUTPATIENT
Start: 2024-10-19 | End: 2024-10-22 | Stop reason: HOSPADM

## 2024-10-19 RX ORDER — DIPHENOXYLATE HYDROCHLORIDE AND ATROPINE SULFATE 2.5; .025 MG/1; MG/1
1 TABLET ORAL DAILY
Status: ON HOLD | COMMUNITY
End: 2024-10-22

## 2024-10-19 RX ORDER — BENZTROPINE MESYLATE 1 MG/1
1 TABLET ORAL
Status: DISCONTINUED | OUTPATIENT
Start: 2024-10-19 | End: 2024-10-22 | Stop reason: HOSPADM

## 2024-10-19 RX ORDER — HYDROXYZINE HYDROCHLORIDE 50 MG/1
50 TABLET, FILM COATED ORAL
Status: DISCONTINUED | OUTPATIENT
Start: 2024-10-19 | End: 2024-10-22 | Stop reason: HOSPADM

## 2024-10-19 RX ORDER — HYDROXYZINE HYDROCHLORIDE 25 MG/1
25 TABLET, FILM COATED ORAL
Status: DISCONTINUED | OUTPATIENT
Start: 2024-10-19 | End: 2024-10-22 | Stop reason: HOSPADM

## 2024-10-19 RX ORDER — POLYETHYLENE GLYCOL 3350 17 G/17G
17 POWDER, FOR SOLUTION ORAL DAILY PRN
Status: DISCONTINUED | OUTPATIENT
Start: 2024-10-19 | End: 2024-10-22 | Stop reason: HOSPADM

## 2024-10-19 RX ORDER — BISACODYL 10 MG
10 SUPPOSITORY, RECTAL RECTAL DAILY PRN
Status: DISCONTINUED | OUTPATIENT
Start: 2024-10-19 | End: 2024-10-22 | Stop reason: HOSPADM

## 2024-10-19 RX ORDER — LANOLIN ALCOHOL/MO/W.PET/CERES
3 CREAM (GRAM) TOPICAL
Status: DISCONTINUED | OUTPATIENT
Start: 2024-10-19 | End: 2024-10-22 | Stop reason: HOSPADM

## 2024-10-19 RX ORDER — LORAZEPAM 2 MG/ML
2 INJECTION INTRAMUSCULAR EVERY 6 HOURS PRN
Status: CANCELLED | OUTPATIENT
Start: 2024-10-19

## 2024-10-19 RX ORDER — LANOLIN ALCOHOL/MO/W.PET/CERES
100 CREAM (GRAM) TOPICAL DAILY
COMMUNITY
End: 2024-10-22

## 2024-10-19 RX ORDER — OLANZAPINE 5 MG/1
5 TABLET ORAL
Status: DISCONTINUED | OUTPATIENT
Start: 2024-10-19 | End: 2024-10-22 | Stop reason: HOSPADM

## 2024-10-19 RX ORDER — IBUPROFEN 400 MG/1
400 TABLET, FILM COATED ORAL EVERY 4 HOURS PRN
Status: DISCONTINUED | OUTPATIENT
Start: 2024-10-19 | End: 2024-10-22 | Stop reason: HOSPADM

## 2024-10-19 RX ORDER — AMLODIPINE BESYLATE 5 MG/1
5 TABLET ORAL DAILY
Status: DISCONTINUED | OUTPATIENT
Start: 2024-10-19 | End: 2024-10-19

## 2024-10-19 RX ORDER — LORAZEPAM 2 MG/ML
2 INJECTION INTRAMUSCULAR EVERY 6 HOURS PRN
Status: DISCONTINUED | OUTPATIENT
Start: 2024-10-19 | End: 2024-10-22 | Stop reason: HOSPADM

## 2024-10-19 RX ORDER — POLYETHYLENE GLYCOL 3350 17 G/17G
17 POWDER, FOR SOLUTION ORAL DAILY PRN
Status: CANCELLED | OUTPATIENT
Start: 2024-10-19

## 2024-10-19 RX ORDER — FOLIC ACID 1 MG/1
1 TABLET ORAL DAILY
Status: ON HOLD | COMMUNITY
End: 2024-10-22

## 2024-10-19 RX ORDER — AMOXICILLIN 250 MG
1 CAPSULE ORAL DAILY PRN
Status: CANCELLED | OUTPATIENT
Start: 2024-10-19

## 2024-10-19 RX ORDER — FOLIC ACID 1 MG/1
1 TABLET ORAL DAILY
Status: CANCELLED | OUTPATIENT
Start: 2024-10-20

## 2024-10-19 RX ORDER — OLANZAPINE 10 MG/2ML
5 INJECTION, POWDER, FOR SOLUTION INTRAMUSCULAR
Status: DISCONTINUED | OUTPATIENT
Start: 2024-10-19 | End: 2024-10-22 | Stop reason: HOSPADM

## 2024-10-19 RX ORDER — HYDROXYZINE HYDROCHLORIDE 25 MG/1
50 TABLET, FILM COATED ORAL
Status: CANCELLED | OUTPATIENT
Start: 2024-10-19

## 2024-10-19 RX ORDER — HYDROXYZINE HYDROCHLORIDE 50 MG/1
100 TABLET, FILM COATED ORAL
Status: DISCONTINUED | OUTPATIENT
Start: 2024-10-19 | End: 2024-10-22 | Stop reason: HOSPADM

## 2024-10-19 RX ORDER — MAGNESIUM HYDROXIDE/ALUMINUM HYDROXICE/SIMETHICONE 120; 1200; 1200 MG/30ML; MG/30ML; MG/30ML
30 SUSPENSION ORAL EVERY 4 HOURS PRN
Status: DISCONTINUED | OUTPATIENT
Start: 2024-10-19 | End: 2024-10-22 | Stop reason: HOSPADM

## 2024-10-19 RX ORDER — IBUPROFEN 800 MG/1
800 TABLET, FILM COATED ORAL EVERY 8 HOURS PRN
Status: DISCONTINUED | OUTPATIENT
Start: 2024-10-19 | End: 2024-10-22 | Stop reason: HOSPADM

## 2024-10-19 RX ORDER — HYDROXYZINE HYDROCHLORIDE 25 MG/1
25 TABLET, FILM COATED ORAL
Status: CANCELLED | OUTPATIENT
Start: 2024-10-19

## 2024-10-19 RX ORDER — IBUPROFEN 400 MG/1
800 TABLET, FILM COATED ORAL EVERY 8 HOURS PRN
Status: CANCELLED | OUTPATIENT
Start: 2024-10-19

## 2024-10-19 RX ORDER — HYDROCHLOROTHIAZIDE 25 MG/1
25 TABLET ORAL DAILY
Status: DISCONTINUED | OUTPATIENT
Start: 2024-10-19 | End: 2024-10-20

## 2024-10-19 RX ORDER — OLANZAPINE 10 MG/2ML
5 INJECTION, POWDER, FOR SOLUTION INTRAMUSCULAR
Status: CANCELLED | OUTPATIENT
Start: 2024-10-19

## 2024-10-19 RX ORDER — PROPRANOLOL HCL 20 MG
10 TABLET ORAL EVERY 8 HOURS PRN
Status: CANCELLED | OUTPATIENT
Start: 2024-10-19

## 2024-10-19 RX ORDER — OLANZAPINE 10 MG/2ML
2.5 INJECTION, POWDER, FOR SOLUTION INTRAMUSCULAR
Status: CANCELLED | OUTPATIENT
Start: 2024-10-19

## 2024-10-19 RX ORDER — AMLODIPINE BESYLATE 5 MG/1
5 TABLET ORAL DAILY
Status: DISCONTINUED | OUTPATIENT
Start: 2024-10-19 | End: 2024-10-19 | Stop reason: HOSPADM

## 2024-10-19 RX ORDER — BISACODYL 10 MG
10 SUPPOSITORY, RECTAL RECTAL DAILY PRN
Status: CANCELLED | OUTPATIENT
Start: 2024-10-19

## 2024-10-19 RX ORDER — BENZTROPINE MESYLATE 1 MG/ML
1 INJECTION, SOLUTION INTRAMUSCULAR; INTRAVENOUS
Status: CANCELLED | OUTPATIENT
Start: 2024-10-19

## 2024-10-19 RX ORDER — LANOLIN ALCOHOL/MO/W.PET/CERES
6 CREAM (GRAM) TOPICAL
Status: DISCONTINUED | OUTPATIENT
Start: 2024-10-19 | End: 2024-10-22 | Stop reason: HOSPADM

## 2024-10-19 RX ORDER — LANOLIN ALCOHOL/MO/W.PET/CERES
6 CREAM (GRAM) TOPICAL
COMMUNITY
End: 2024-10-22

## 2024-10-19 RX ORDER — PROPRANOLOL HYDROCHLORIDE 10 MG/1
10 TABLET ORAL EVERY 8 HOURS PRN
Status: DISCONTINUED | OUTPATIENT
Start: 2024-10-19 | End: 2024-10-22 | Stop reason: HOSPADM

## 2024-10-19 RX ORDER — LANOLIN ALCOHOL/MO/W.PET/CERES
100 CREAM (GRAM) TOPICAL DAILY
Status: DISCONTINUED | OUTPATIENT
Start: 2024-10-20 | End: 2024-10-22 | Stop reason: HOSPADM

## 2024-10-19 RX ORDER — DIPHENHYDRAMINE HYDROCHLORIDE 50 MG/ML
50 INJECTION INTRAMUSCULAR; INTRAVENOUS EVERY 6 HOURS PRN
Status: CANCELLED | OUTPATIENT
Start: 2024-10-19

## 2024-10-19 RX ORDER — IBUPROFEN 600 MG/1
600 TABLET, FILM COATED ORAL EVERY 6 HOURS PRN
Status: CANCELLED | OUTPATIENT
Start: 2024-10-19

## 2024-10-19 RX ORDER — AMOXICILLIN 250 MG
1 CAPSULE ORAL DAILY PRN
Status: DISCONTINUED | OUTPATIENT
Start: 2024-10-19 | End: 2024-10-22 | Stop reason: HOSPADM

## 2024-10-19 RX ORDER — MAGNESIUM HYDROXIDE/ALUMINUM HYDROXICE/SIMETHICONE 120; 1200; 1200 MG/30ML; MG/30ML; MG/30ML
30 SUSPENSION ORAL EVERY 4 HOURS PRN
Status: CANCELLED | OUTPATIENT
Start: 2024-10-19

## 2024-10-19 RX ORDER — LANOLIN ALCOHOL/MO/W.PET/CERES
3 CREAM (GRAM) TOPICAL
Status: CANCELLED | OUTPATIENT
Start: 2024-10-19

## 2024-10-19 RX ORDER — NICOTINE 21 MG/24HR
1 PATCH, TRANSDERMAL 24 HOURS TRANSDERMAL DAILY
Status: DISCONTINUED | OUTPATIENT
Start: 2024-10-19 | End: 2024-10-21

## 2024-10-19 RX ORDER — IBUPROFEN 400 MG/1
400 TABLET, FILM COATED ORAL EVERY 4 HOURS PRN
Status: CANCELLED | OUTPATIENT
Start: 2024-10-19

## 2024-10-19 RX ORDER — FOLIC ACID 1 MG/1
1 TABLET ORAL DAILY
Status: DISCONTINUED | OUTPATIENT
Start: 2024-10-20 | End: 2024-10-22 | Stop reason: HOSPADM

## 2024-10-19 RX ADMIN — FOLIC ACID 1 MG: 1 TABLET ORAL at 08:36

## 2024-10-19 RX ADMIN — NICOTINE POLACRILEX 4 MG: 4 GUM, CHEWING BUCCAL at 17:55

## 2024-10-19 RX ADMIN — SODIUM CHLORIDE 150 ML/HR: 0.9 INJECTION, SOLUTION INTRAVENOUS at 06:20

## 2024-10-19 RX ADMIN — HYDROCHLOROTHIAZIDE 25 MG: 25 TABLET ORAL at 16:02

## 2024-10-19 RX ADMIN — THIAMINE HCL TAB 100 MG 100 MG: 100 TAB at 08:36

## 2024-10-19 RX ADMIN — Medication 1 TABLET: at 08:36

## 2024-10-19 NOTE — NURSING NOTE
Patient admitted to [facility] from [facility] this afternoon at 1444 under a 201. Patient had used meth and wandered into the woods and experienced meth-induced psychosis and was having hallucinations.  Patient has a history of alcoholism (reports drinking approximately 3 glasses of wine per day and/or a six pack of beer). Reports that he stopped drinking approximately one week ago. CIWA is 0. No signs of alcohol withdrawal observed. He is concerned about his drinking habits and would like to go to rehab.  He denies depression and anxiety. Denies hallucinations. Denies SI/HI. He has no psychiatric history. He has not been taking any medications at home. He states he was taking blood pressure medications in the past but has only been taking vitamins over the past year or two.  He was oriented to the unit and offered snacks and drinks. VS assessed. BP elevated. Dr. Rodriguez aware and HCTZ ordered for patient. Trace edema in BLLE. No complaints of pain.

## 2024-10-19 NOTE — ED NOTES
Patient is accepted at [facility]   Patient is accepted by Dr. Haja Sepulveda in INTAKE     Transportation is arranged with [facility].     Transportation is scheduled for 115pm   Patient may go to the floor at 115pm          Nurse report is to be called to  prior to patient transfer.

## 2024-10-19 NOTE — PLAN OF CARE
Problem: SAFETY ADULT  Goal: Patient will remain free of falls  Description: INTERVENTIONS:  - Educate patient/family on patient safety including physical limitations  - Instruct patient to call for assistance with activity   - Consult OT/PT to assist with strengthening/mobility   - Keep Call bell within reach  - Keep bed low and locked with side rails adjusted as appropriate  - Keep care items and personal belongings within reach  - Initiate and maintain comfort rounds  - Make Fall Risk Sign visible to staff  - Offer Toileting every 3 Hours, in advance of need  - Initiate/Maintain alarm  - Obtain necessary fall risk management equipment:   - Apply yellow socks and bracelet for high fall risk patients  - Consider moving patient to room near nurses station  Outcome: Progressing  Goal: Maintain or return to baseline ADL function  Description: INTERVENTIONS:  -  Assess patient's ability to carry out ADLs; assess patient's baseline for ADL function and identify physical deficits which impact ability to perform ADLs (bathing, care of mouth/teeth, toileting, grooming, dressing, etc.)  - Assess/evaluate cause of self-care deficits   - Assess range of motion  - Assess patient's mobility; develop plan if impaired  - Assess patient's need for assistive devices and provide as appropriate  - Encourage maximum independence but intervene and supervise when necessary  - Involve family in performance of ADLs  - Assess for home care needs following discharge   - Consider OT consult to assist with ADL evaluation and planning for discharge  - Provide patient education as appropriate  Outcome: Progressing  Goal: Maintains/Returns to pre admission functional level  Description: INTERVENTIONS:  - Perform AM-PAC 6 Click Basic Mobility/ Daily Activity assessment daily.  - Set and communicate daily mobility goal to care team and patient/family/caregiver.   - Collaborate with rehabilitation services on mobility goals if consulted  -  Perform Range of Motion 3 times a day.  - Reposition patient every 2 hours.  - Dangle patient 3 times a day  - Stand patient 3 times a day  - Ambulate patient 3 times a day  - Out of bed to chair 3 times a day   - Out of bed for meals 3 times a day  - Out of bed for toileting  - Record patient progress and toleration of activity level   Outcome: Progressing     Problem: DISCHARGE PLANNING  Goal: Discharge to home or other facility with appropriate resources  Description: INTERVENTIONS:  - Identify barriers to discharge w/patient and caregiver  - Arrange for needed discharge resources and transportation as appropriate  - Identify discharge learning needs (meds, wound care, etc.)  - Arrange for interpretive services to assist at discharge as needed  - Refer to Case Management Department for coordinating discharge planning if the patient needs post-hospital services based on physician/advanced practitioner order or complex needs related to functional status, cognitive ability, or social support system  Outcome: Progressing     Problem: Knowledge Deficit  Goal: Patient/family/caregiver demonstrates understanding of disease process, treatment plan, medications, and discharge instructions  Description: Complete learning assessment and assess knowledge base.  Interventions:  - Provide teaching at level of understanding  - Provide teaching via preferred learning methods  Outcome: Progressing     Problem: PAIN - ADULT  Goal: Verbalizes/displays adequate comfort level or baseline comfort level  Description: Interventions:  - Encourage patient to monitor pain and request assistance  - Assess pain using appropriate pain scale  - Administer analgesics based on type and severity of pain and evaluate response  - Implement non-pharmacological measures as appropriate and evaluate response  - Consider cultural and social influences on pain and pain management  - Notify physician/advanced practitioner if interventions unsuccessful or  patient reports new pain  Outcome: Progressing     Problem: INFECTION - ADULT  Goal: Absence or prevention of progression during hospitalization  Description: INTERVENTIONS:  - Assess and monitor for signs and symptoms of infection  - Monitor lab/diagnostic results  - Monitor all insertion sites, i.e. indwelling lines, tubes, and drains  - Monitor endotracheal if appropriate and nasal secretions for changes in amount and color  - [place] appropriate cooling/warming therapies per order  - Administer medications as ordered  - Instruct and encourage patient and family to use good hand hygiene technique  - Identify and instruct in appropriate isolation precautions for identified infection/condition  Outcome: Progressing  Goal: Absence of fever/infection during neutropenic period  Description: INTERVENTIONS:  - Monitor WBC    Outcome: Progressing     Problem: NEUROSENSORY - ADULT  Goal: Achieves stable or improved neurological status  Description: INTERVENTIONS  - Monitor and report changes in neurological status  - Monitor vital signs such as temperature, blood pressure, glucose, and any other labs ordered   - Initiate measures to prevent increased intracranial pressure  - Monitor for seizure activity and implement precautions if appropriate      Outcome: Progressing  Goal: Remains free of injury related to seizures activity  Description: INTERVENTIONS  - Maintain airway, patient safety  and administer oxygen as ordered  - Monitor patient for seizure activity, document and report duration and description of seizure to physician/advanced practitioner  - If seizure occurs,  ensure patient safety during seizure  - Reorient patient post seizure  - Seizure pads on all 4 side rails  - Instruct patient/family to notify RN of any seizure activity including if an aura is experienced  - Instruct patient/family to call for assistance with activity based on nursing assessment  - Administer anti-seizure medications if  ordered    Outcome: Progressing  Goal: Achieves maximal functionality and self care  Description: INTERVENTIONS  - Monitor swallowing and airway patency with patient fatigue and changes in neurological status  - Encourage and assist patient to increase activity and self care.   - Encourage visually impaired, hearing impaired and aphasic patients to use assistive/communication devices  Outcome: Progressing

## 2024-10-19 NOTE — PLAN OF CARE
Problem: PSYCHOSIS  Goal: Will report no hallucinations or delusions  Description: Interventions:  - Administer medication as  ordered  - Every waking shifts and PRN assess for the presence of hallucinations and or delusions  - Assist with reality testing to support increasing orientation  - Assess if patient's hallucinations or delusions are encouraging self-harm or harm to others and intervene as appropriate  Outcome: Progressing     Problem: SUBSTANCE USE/ABUSE  Goal: Will have no detox symptoms and will verbalize plan for changing substance-related behavior  Description: INTERVENTIONS:  - Monitor physical status and assess for symptoms of withdrawal  - Administer medication as ordered  - Provide emotional support with 1 on 1 interaction with staff  - Encourage recovery focused program/ addiction education  - Assess for verbalization of changing behaviors related to substance abuse  - Initiate consults and referrals as appropriate (Case Management, Spiritual Care, etc.)  Outcome: Progressing  Goal: By discharge, will develop insight into their chemical dependency and sustain motivation to continue in recovery  Description: INTERVENTIONS:  - Attends all daily group sessions and scheduled AA groups  - Actively practices coping skills through participation in the therapeutic community and adherence to program rules  - Reviews and completes assignments from individual treatment plan  - Assist patient development of understanding of their personal cycle of addiction and relapse triggers  Outcome: Progressing  Goal: By discharge, patient will have ongoing treatment plan addressing chemical dependency  Description: INTERVENTIONS:  - Assist patient with resources and/or appointments for ongoing recovery based living  Outcome: Progressing     Patient admitted this afternoon, care plan initiated.

## 2024-10-19 NOTE — PROGRESS NOTES
10/19/24 1446   Status of Admission   72 Hour Notice Initiated   Date patient signed 72h Notice 10/19/24   Time patient signed 72h Notice 1441

## 2024-10-19 NOTE — ED NOTES
CW was able to speak with Pravin from [facility], unable to take pt at this time due to him being on a 201. Once completing mental health treatment he is able to admit there. CW informed pt, pt open to bedsearch at this time. Sent 201 to intake for review

## 2024-10-19 NOTE — H&P
Richa Monterroso#  :1980 ARAM  MRN:[id number]    CSN:[id number]  Adm Date: 10/19/2024 144  2:44 PM   ATT PHY: Darshana Goode Md  [facility]         Chief Complaint: Psychosis    History of Presenting Illness: Santiago Monterroso is a(n) 44 y.o. year old male was admitted voluntarily who was admitted voluntarily due to worsening psychotic symptoms.    Patient examined at bedside.  Patient denied any active suicidal homicidal ideations.    No Known Allergies    Current Facility-Administered Medications on File Prior to Encounter   Medication Dose Route Frequency Provider Last Rate Last Admin    [DISCONTINUED] acetaminophen (TYLENOL) tablet 650 mg  650 mg Oral Q6H PRN Mary Rivera PA-C        [DISCONTINUED] aluminum-magnesium hydroxide-simethicone (MAALOX) oral suspension 30 mL  30 mL Oral Q6H PRN Mary Rivera PA-C        [DISCONTINUED] amLODIPine (NORVASC) tablet 5 mg  5 mg Oral Daily Karthikeyan Jaquez MD        [DISCONTINUED] folic acid (FOLVITE) tablet 1 mg  1 mg Oral Daily Mayr Rivera PA-C   1 mg at 10/19/24 0836    [DISCONTINUED] melatonin tablet 6 mg  6 mg Oral HS Mary Rivera PA-C   6 mg at 10/18/24 2124    [DISCONTINUED] multivitamin-minerals (CENTRUM) tablet 1 tablet  1 tablet Oral Daily Mary Rivera PA-C   1 tablet at 10/19/24 0836    [DISCONTINUED] OLANZapine (ZyPREXA) IM injection 5 mg  5 mg Intramuscular Q3H PRN Mary Rivera PA-C        [DISCONTINUED] ondansetron (ZOFRAN) injection 4 mg  4 mg Intravenous Q6H PRN Mary Rivera PA-C        [DISCONTINUED] sodium chloride 0.9 % infusion  150 mL/hr Intravenous Continuous Mary Rivera PA-C 150 mL/hr at 10/19/24 0620 150 mL/hr at 10/19/24 0620    [DISCONTINUED] thiamine tablet 100 mg  100 mg Oral Daily Mary Rivera PA-C   100 mg at 10/19/24 0836     Current Outpatient Medications on File Prior to Encounter   Medication Sig Dispense Refill    amLODIPine (NORVASC) 5 mg tablet Take 1  tablet (5 mg total) by mouth daily      multivitamin (THERAGRAN) TABS Take 1 tablet by mouth daily      acetaminophen (TYLENOL) 325 mg tablet Take 2 tablets (650 mg total) by mouth every 6 (six) hours as needed for mild pain, moderate pain, fever or headaches for up to 2 doses         Active Ambulatory Problems     Diagnosis Date Noted    Alcohol use 10/17/2024    Rhabdomyolysis 10/17/2024    URIAH (acute kidney injury) ([facility]) 10/17/2024    SIRS (systemic inflammatory response syndrome) ([facility]) 10/17/2024    Hyponatremia 10/17/2024    Metabolic acidosis 10/17/2024    Psychosis ([facility]) 10/17/2024    Metabolic encephalopathy 10/17/2024     Resolved Ambulatory Problems     Diagnosis Date Noted    No Resolved Ambulatory Problems     Past Medical History:   Diagnosis Date    Alcohol abuse     Hypertension     Nicotine addiction        History reviewed. No pertinent surgical history.    Social History:   Social History     Socioeconomic History    Marital status: Single     Spouse name: None    Number of children: None    Years of education: None    Highest education level: None   Occupational History    None   Tobacco Use    Smoking status: Never    Smokeless tobacco: Never   Vaping Use    Vaping status: Never Used   Substance and Sexual Activity    Alcohol use: Never    Drug use: Yes     Types: Methamphetamines    Sexual activity: None   Other Topics Concern    None   Social History Narrative    None     Social Determinants of Health     Financial Resource Strain: Not on file   Food Insecurity: Patient Unable To Answer (10/17/2024)    Hunger Vital Sign     Worried About Running Out of Food in the Last Year: Patient unable to answer     Ran Out of Food in the Last Year: Patient unable to answer   Transportation Needs: Patient Unable To Answer (10/17/2024)    PRAPARE - Transportation     Lack of Transportation (Medical): Patient unable to answer     Lack of Transportation (Non-Medical): Patient unable to answer   Physical  Activity: Not on file   Stress: Not on file   Social Connections: Unknown (6/18/2024)    Received from [employer]     How often do you feel lonely or isolated from those around you? (Adult - for ages 18 years and over): Not on file   Intimate Partner Violence: Not on file   Housing Stability: Patient Unable To Answer (10/17/2024)    Housing Stability Vital Sign     Unable to Pay for Housing in the Last Year: Patient unable to answer     Number of Times Moved in the Last Year: 0     Homeless in the Last Year: Patient unable to answer       Family History:   Family History   Problem Relation Age of Onset    No Known Problems Mother     Heart disease Father     Hypertension Father     Diabetes Father        Review of Systems   Musculoskeletal:  Positive for arthralgias.   Neurological:  Positive for headaches.   Psychiatric/Behavioral:  Positive for sleep disturbance.    All other systems reviewed and are negative.      Physical Exam   Vitals: There were no vitals taken for this visit.,There is no height or weight on file to calculate BMI.  Constitutional: Awake and Alert. Well-developed and well-nourished. No distress.   HENT: PERR,EOMI, conjunctiva normal  Head: Normocephalic and atraumatic.   Mouth/Throat: Oropharynx is clear and moist.    Eyes: Conjunctivae and EOM are normal. Pupils are equal, round, and reactive to light. Right and left eye exhibits no discharge.  Neck: Neck supple. No tracheal deviation present. No thyromegaly present.   Cardiovascular: Normal rate, regular rhythm and normal heart sounds.  Exam reveals no friction rub. No murmur heard.  Pulmonary/Chest: Effort normal and breath sounds normal. No respiratory distress. She has no wheezes.   Abdominal: Soft. Bowel sounds are normal. She exhibits no distension. There is no tenderness. There is no rebound and no guarding.   Neurological: Cranial Nerves grossly intact. No sensory deficit. Coordination normal.   Musculoskeletal:    Nontender spine  Skin: Skin is warm and dry. No rash noted. No diaphoresis. No erythema. No edema. No cyanosis.    Assessment     Santiago Monterroso is a(n) 44 y.o. year old male with Psychosis NOS    Cardiac with hypertension.  I will put the patient on hydrochlorothiazide 25 mg daily.  Patient was not taking his amlodipine because of edema and also developed cough with lisinopril.  Nicotine abuse.  I will put the patient on nicotine transdermal patch 21 mg daily along with nicotine gum as needed.  Alcohol abuse.  Patient has been put on thiamine, folic acid and multivitamin.  Insomnia.  Patient is on melatonin 6 mg at bedtime.  Arthralgia/headache.  Patient may get ibuprofen as needed.  Psych with psychosis.  Patient is being managed by psych.    Prognosis: Fair.    Discharge Plan: In progress.    Advanced Directives: I have discussed in detail the patient the advanced directives.  Patient has not appointed anybody as his POA and has no living will with advanced healthcare directives.  Patient's first contact is his daughter Felicia Monterroso and her phone number is [phone].  When discussing cardiac and pulmonary resuscitation efforts with the patient, the patient wishes to be FULL CODE.    I have spent more than 50 minutes gathering data, doing physical examination, and discussing the advanced directives, which was witnessed by caring staff.

## 2024-10-19 NOTE — PROGRESS NOTES
10/19/24 1503 10/19/24 1534   Provider Notification   Reason for Communication Admission  (PTA meds) Admission  (PTA meds/CSSRS)   Provider Name Dr. Michael Lima   Provider Role Attending physician Attending physician   Method of Communication Other (Comment)  (Epic Chat) Other (Comment)  (EpicChat)   Response See orders See orders   Notification Time 1506 [id number]

## 2024-10-19 NOTE — DISCHARGE SUMMARY
Discharge Summary - Hospitalist   Name: Santiago Monterroso 44 y.o. male I MRN: [id number]  Unit/Bed#: -01 I Date of Admission: 10/17/2024   Date of Service: 10/19/2024 I Hospital Day: 0     Assessment & Plan  Rhabdomyolysis  Mild in the setting of drug use and being out in the woods  Received IV fluids with appropriate downtrending his CK levels  DC IV fluids    Kidney function is normal   URIAH (acute kidney injury) (HCC)  Cr normalized with fluids   He says he was out in the woods for 48 hrs without food or water   Psychosis (HCC)  Patient found in the woods by police with noted visual hallucinations, upon arrival to ED noted tactile hallucinations thinking water is dripping on him  Pt reported that he last used intranasal methamphetamine on Sunday and then had a spiritual awakening that lead him to be out in the woods praying for 48 hrs to try and find God   Psychiatry consulted in ED, they recommended transfer to inpatient psychiatric facility once stable from medical perspective  Patient may not leave AMA  Patient signed 201 today and he will be transferred to [facility] today   Alcohol use  Patient denied any significant etoh use to me.   Ethanol level was negative  Placed on CIWA protocol initially -however very low concern for alcohol withdrawal at this point  Daily folic acid, thiamine, multivitamin was started   SIRS (systemic inflammatory response syndrome) (HCC)  Meeting criteria due to tachycardia and tachypnea and leukocytosis  This is in the setting of being out in the woods for almost 48 hours without food or water  Concern for infection at this time.  No antibiotics administered the patient is doing well this morning  WBC count normalized     Hyponatremia  Sodium 131  In the setting of hypovolemia  IV fluid hydration with normal saline  Improving to 134 yesterday   Normal at 137 today   Can't stop fluids   Metabolic acidosis  Resolved with IVF  Metabolic encephalopathy  Resolved and patient aaox3 at  this time     Medical Problems       Resolved Problems  Date Reviewed: 10/17/2024   None       Discharging Physician / Practitioner: Karthikeyan Jaquez MD  PCP: No primary care provider on file.  Admission Date:   Admission Orders (From admission, onward)       Ordered        10/17/24 2031  Place in Observation  Once            Signed and Held  ED TO DIFFERENT CAMPUS [facility] UNIT or INPATIENT MEDICAL UNIT to [facility] UNIT (using Discharge Readmit Navigator) - Admit Patient to  Behavioral Health Unit  Once                          Discharge Date: 10/19/24    Consultations During Hospital Stay:  Psychiatry     Procedures Performed:   none    Significant Findings / Test Results:   CTH - normal    Incidental Findings:   none     Test Results Pending at Discharge (will require follow up):   none     Outpatient Tests Requested:  none    Complications:  none    Reason for Admission: psychosis     Hospital Course:   Santiago Monterroso is a 44 y.o. male patient who originally presented to the hospital on 10/17/2024 due to psychosis likely related to methamphetamine use.  He was found out in the woods by police.  Reportedly was out there for almost 48 hours without food or water.  Was found to have rhabdomyolysis and URIAH which resolved after fluid administration.  His mental status improved and is at baseline. Mood and affect appear normal at this time.  He is evaluated by psychiatry and recommended 201 which he signed.  He is being transferred to inpatient psychiatric unit at [facility]. No si/hi reported. Feels well.       Please see above list of diagnoses and related plan for additional information.     Condition at Discharge: good    Discharge Day Visit / Exam:   * Please refer to separate progress note for these details *    Discussion with Family: Updated  (significant other) at bedside.    Discharge instructions/Information to patient and family:   See after visit summary for information provided to patient and  family.      Provisions for Follow-Up Care:  See after visit summary for information related to follow-up care and any pertinent home health orders.      Mobility at time of Discharge:   Basic Mobility Inpatient Raw Score: 24  JH-HLM Goal: 8: Walk 250 feet or more  JH-HLM Achieved: 8: Walk 250 feet ot more  HLM Goal achieved. Continue to encourage appropriate mobility.     Disposition:   Inpatient Psychiatry at [facility]    Planned Readmission: inpt psych    Discharge Medications:  See after visit summary for reconciled discharge medications provided to patient and/or family.      Administrative Statements   Discharge Statement:  I have spent a total time of 40+ minutes in caring for this patient on the day of the visit/encounter. >30 minutes of time was spent on: Diagnostic results, Prognosis, Risks and benefits of tx options, Instructions for management, and Patient and family education.    **Please Note: This note may have been constructed using a voice recognition system**

## 2024-10-19 NOTE — PLAN OF CARE
Problem: SAFETY ADULT  Goal: Maintain or return to baseline ADL function  Description: INTERVENTIONS:  -  Assess patient's ability to carry out ADLs; assess patient's baseline for ADL function and identify physical deficits which impact ability to perform ADLs (bathing, care of mouth/teeth, toileting, grooming, dressing, etc.)  - Assess/evaluate cause of self-care deficits   - Assess range of motion  - Assess patient's mobility; develop plan if impaired  - Assess patient's need for assistive devices and provide as appropriate  - Encourage maximum independence but intervene and supervise when necessary  - Involve family in performance of ADLs  - Assess for home care needs following discharge   - Consider OT consult to assist with ADL evaluation and planning for discharge  - Provide patient education as appropriate  Outcome: Progressing     Problem: INFECTION - ADULT  Goal: Absence or prevention of progression during hospitalization  Description: INTERVENTIONS:  - Assess and monitor for signs and symptoms of infection  - Monitor lab/diagnostic results  - Monitor all insertion sites, i.e. indwelling lines, tubes, and drains  - Monitor endotracheal if appropriate and nasal secretions for changes in amount and color  - [place] appropriate cooling/warming therapies per order  - Administer medications as ordered  - Instruct and encourage patient and family to use good hand hygiene technique  - Identify and instruct in appropriate isolation precautions for identified infection/condition  Outcome: Progressing

## 2024-10-19 NOTE — ASSESSMENT & PLAN NOTE
Meeting criteria due to tachycardia and tachypnea and leukocytosis  This is in the setting of being out in the woods for almost 48 hours without food or water  Concern for infection at this time.  No antibiotics administered the patient is doing well this morning  WBC count normalized

## 2024-10-19 NOTE — NURSING NOTE
Pt came in with the following belongings:    Remains with Pt:  X4 pairs of socks  X4 underwear  X1 pair of jeans  X4 t-shirts  X1 pj pants  X1 sweatpants  X1 pair of slippers  X1 long sleeve shirt    Storage:  X7 pairs of socks  X4 underwear  X1 sweatshirt  X3 skoal containers  X1 toothpaste  X1 toothbrush  X1 iced tea  X1 bookbag  X1 sweatpants    Security:  X3 $20 bills

## 2024-10-19 NOTE — ED NOTES
Insurance Authorization for admission:   Phone call placed to [facility]  Phone number: [phone]     Spoke to **.     ** days approved.  Level of care: **.  Review on **.   Authorization # Office is closed, Open Monday- Friday 8am-5pm, able to fax clinicals for review at - sent         Eligibility Verification System checked - ([phone]).  Online system / automated system indicates: **    Insurance Authorization for Transportation:    Phone call placed to **.   Phone number **.   Spoke to **.   Authorization #: **

## 2024-10-19 NOTE — ASSESSMENT & PLAN NOTE
Mild in the setting of drug use and being out in the woods  Received IV fluids with appropriate downtrending his CK levels  DC IV fluids    Kidney function is normal

## 2024-10-19 NOTE — ASSESSMENT & PLAN NOTE
Sodium 131  In the setting of hypovolemia  IV fluid hydration with normal saline  Improving to 134 yesterday   Normal at 137 today   Can't stop fluids

## 2024-10-19 NOTE — ASSESSMENT & PLAN NOTE
Patient found in the woods by police with noted visual hallucinations, upon arrival to ED noted tactile hallucinations thinking water is dripping on him  Pt reported that he last used intranasal methamphetamine on Sunday and then had a spiritual awakening that lead him to be out in the woods praying for 48 hrs to try and find God   Psychiatry consulted in ED, they recommended transfer to inpatient psychiatric facility once stable from medical perspective  Patient may not leave AMA  Patient signed 201 today and he will be transferred to [facility] today

## 2024-10-20 PROBLEM — F10.20 ALCOHOL USE DISORDER, MODERATE, DEPENDENCE (HCC): Status: ACTIVE | Noted: 2024-10-20

## 2024-10-20 PROBLEM — F15.10 METHAMPHETAMINE USE DISORDER, MILD, ABUSE (HCC): Status: ACTIVE | Noted: 2024-10-20

## 2024-10-20 PROBLEM — F39 UNSPECIFIED MOOD (AFFECTIVE) DISORDER (HCC): Status: ACTIVE | Noted: 2024-10-20

## 2024-10-20 LAB
25(OH)D3 SERPL-MCNC: 17 NG/ML (ref 30–100)
ALBUMIN SERPL BCG-MCNC: 3.7 G/DL (ref 3.5–5)
ALP SERPL-CCNC: 31 U/L (ref 34–104)
ALT SERPL W P-5'-P-CCNC: 41 U/L (ref 7–52)
AMPHETAMINES SERPL QL SCN: NEGATIVE
ANION GAP SERPL CALCULATED.3IONS-SCNC: 4 MMOL/L (ref 4–13)
AST SERPL W P-5'-P-CCNC: 34 U/L (ref 13–39)
ATRIAL RATE: 62 BPM
BARBITURATES UR QL: NEGATIVE
BENZODIAZ UR QL: NEGATIVE
BILIRUB SERPL-MCNC: 0.78 MG/DL (ref 0.2–1)
BILIRUB UR QL STRIP: NEGATIVE
BUN SERPL-MCNC: 15 MG/DL (ref 5–25)
CALCIUM SERPL-MCNC: 9.5 MG/DL (ref 8.4–10.2)
CHLORIDE SERPL-SCNC: 105 MMOL/L (ref 96–108)
CHOLEST SERPL-MCNC: 185 MG/DL
CLARITY UR: CLEAR
CO2 SERPL-SCNC: 30 MMOL/L (ref 21–32)
COCAINE UR QL: NEGATIVE
COLOR UR: NORMAL
CREAT SERPL-MCNC: 1.05 MG/DL (ref 0.6–1.3)
FENTANYL UR QL SCN: NEGATIVE
FOLATE SERPL-MCNC: 10.8 NG/ML
GFR SERPL CREATININE-BSD FRML MDRD: 85 ML/MIN/1.73SQ M
GLUCOSE P FAST SERPL-MCNC: 98 MG/DL (ref 65–99)
GLUCOSE SERPL-MCNC: 98 MG/DL (ref 65–140)
GLUCOSE UR STRIP-MCNC: NEGATIVE MG/DL
HDLC SERPL-MCNC: 49 MG/DL
HGB UR QL STRIP.AUTO: NEGATIVE
HYDROCODONE UR QL SCN: NEGATIVE
KETONES UR STRIP-MCNC: NEGATIVE MG/DL
LDLC SERPL CALC-MCNC: 113 MG/DL (ref 0–100)
LEUKOCYTE ESTERASE UR QL STRIP: NEGATIVE
MAGNESIUM SERPL-MCNC: 2.2 MG/DL (ref 1.9–2.7)
METHADONE UR QL: NEGATIVE
NITRITE UR QL STRIP: NEGATIVE
NONHDLC SERPL-MCNC: 136 MG/DL
OPIATES UR QL SCN: NEGATIVE
OXYCODONE+OXYMORPHONE UR QL SCN: NEGATIVE
P AXIS: 35 DEGREES
PCP UR QL: NEGATIVE
PH UR STRIP.AUTO: 6 [PH]
PHOSPHATE SERPL-MCNC: 3.5 MG/DL (ref 2.7–4.5)
POTASSIUM SERPL-SCNC: 4.1 MMOL/L (ref 3.5–5.3)
PR INTERVAL: 144 MS
PROT SERPL-MCNC: 6.3 G/DL (ref 6.4–8.4)
PROT UR STRIP-MCNC: NEGATIVE MG/DL
QRS AXIS: 44 DEGREES
QRSD INTERVAL: 92 MS
QT INTERVAL: 404 MS
QTC INTERVAL: 410 MS
SODIUM SERPL-SCNC: 139 MMOL/L (ref 135–147)
SP GR UR STRIP.AUTO: 1.02
T WAVE AXIS: 34 DEGREES
T3FREE SERPL-MCNC: 3.3 PG/ML (ref 2.5–3.9)
THC UR QL: NEGATIVE
TRIGL SERPL-MCNC: 116 MG/DL
TSH SERPL DL<=0.05 MIU/L-ACNC: 0.83 UIU/ML (ref 0.45–4.5)
UROBILINOGEN UR QL STRIP.AUTO: 0.2 E.U./DL
VENTRICULAR RATE: 62 BPM
VIT B12 SERPL-MCNC: 625 PG/ML (ref 180–914)

## 2024-10-20 PROCEDURE — 82746 ASSAY OF FOLIC ACID SERUM: CPT

## 2024-10-20 PROCEDURE — 80053 COMPREHEN METABOLIC PANEL: CPT

## 2024-10-20 PROCEDURE — 84100 ASSAY OF PHOSPHORUS: CPT

## 2024-10-20 PROCEDURE — 86780 TREPONEMA PALLIDUM: CPT

## 2024-10-20 PROCEDURE — 83735 ASSAY OF MAGNESIUM: CPT

## 2024-10-20 PROCEDURE — 84443 ASSAY THYROID STIM HORMONE: CPT

## 2024-10-20 PROCEDURE — 82306 VITAMIN D 25 HYDROXY: CPT

## 2024-10-20 PROCEDURE — 80307 DRUG TEST PRSMV CHEM ANLYZR: CPT

## 2024-10-20 PROCEDURE — 84481 FREE ASSAY (FT-3): CPT

## 2024-10-20 PROCEDURE — 81003 URINALYSIS AUTO W/O SCOPE: CPT

## 2024-10-20 PROCEDURE — 99223 1ST HOSP IP/OBS HIGH 75: CPT

## 2024-10-20 PROCEDURE — 80061 LIPID PANEL: CPT

## 2024-10-20 PROCEDURE — 82607 VITAMIN B-12: CPT

## 2024-10-20 PROCEDURE — 93010 ELECTROCARDIOGRAM REPORT: CPT | Performed by: INTERNAL MEDICINE

## 2024-10-20 RX ORDER — CLONIDINE HYDROCHLORIDE 0.1 MG/1
0.1 TABLET ORAL EVERY 6 HOURS
Status: DISCONTINUED | OUTPATIENT
Start: 2024-10-20 | End: 2024-10-20

## 2024-10-20 RX ORDER — CLONIDINE HYDROCHLORIDE 0.1 MG/1
0.1 TABLET ORAL EVERY 6 HOURS PRN
Status: DISCONTINUED | OUTPATIENT
Start: 2024-10-20 | End: 2024-10-22 | Stop reason: HOSPADM

## 2024-10-20 RX ORDER — HYDROCHLOROTHIAZIDE 25 MG/1
50 TABLET ORAL DAILY
Status: DISCONTINUED | OUTPATIENT
Start: 2024-10-21 | End: 2024-10-22 | Stop reason: HOSPADM

## 2024-10-20 RX ORDER — CLONIDINE HYDROCHLORIDE 0.1 MG/1
0.1 TABLET ORAL EVERY 6 HOURS PRN
Status: DISCONTINUED | OUTPATIENT
Start: 2024-10-20 | End: 2024-10-20

## 2024-10-20 RX ADMIN — THIAMINE HCL TAB 100 MG 100 MG: 100 TAB at 08:58

## 2024-10-20 RX ADMIN — Medication 1 TABLET: at 08:58

## 2024-10-20 RX ADMIN — CLONIDINE HYDROCHLORIDE 0.1 MG: 0.1 TABLET ORAL at 09:33

## 2024-10-20 RX ADMIN — FOLIC ACID 1 MG: 1 TABLET ORAL at 08:58

## 2024-10-20 RX ADMIN — NICOTINE POLACRILEX 4 MG: 4 GUM, CHEWING BUCCAL at 15:08

## 2024-10-20 RX ADMIN — NICOTINE POLACRILEX 4 MG: 4 GUM, CHEWING BUCCAL at 12:01

## 2024-10-20 RX ADMIN — HYDROCHLOROTHIAZIDE 25 MG: 25 TABLET ORAL at 08:58

## 2024-10-20 RX ADMIN — NICOTINE POLACRILEX 4 MG: 4 GUM, CHEWING BUCCAL at 19:42

## 2024-10-20 RX ADMIN — NICOTINE POLACRILEX 4 MG: 4 GUM, CHEWING BUCCAL at 17:46

## 2024-10-20 NOTE — PROGRESS NOTES
10/20/24 1330   Charting Type   Charting Type   (BLEPS)   Respiratory   Respiratory (WDL) WDL   Respiratory Pattern Normal   Chest Assessment Chest expansion symmetrical   Bilateral Breath Sounds Clear   Peripheral Vascular   Peripheral Vascular (WDL) X   Cyanosis None   Capillary Refill Less than/equal to 2 seconds (All extremities)   Pulses R radial;L radial;R pedal;L pedal   Edema Left lower extremity;Right lower extremity   RLE Edema +1   LLE Edema Trace   RUE Neurovascular Assessment   R Radial Pulse Palpable   LUE Neurovascular Assessment   L Radial Pulse Palpable   RLE Neurovascular Assessment   R Pedal Pulse Palpable   LLE Neurovascular Assessment   L Pedal Pulse Palpable   Integumentary   Integumentary (WDL) WDL   Skin Color Appropriate for ethnicity   Skin Condition/Temp Warm;Dry   Skin Integrity Abrasion   Skin Location   (Right forearm)   Gastrointestinal   Gastrointestinal (WDL) WDL   Abdomen Inspection Soft;Nondistended   Bowel Sounds (All Quadrants) Normoactive   Tenderness Soft;Nontender   Last BM Date 10/20/24   GI Symptoms None   Cough   Cough None

## 2024-10-20 NOTE — NURSING NOTE
BP elevated in morning at 178/95. Administered scheduled hydrochlorothiazide 25 mg as prescribed. Reassessment BP remained elevated 164/100. Administered clonidine 0.1 mg po prn. Will evaluate effectiveness. Pt is asymptomatic. Hx of HTN. Pt non compliant with BP medications due to side effects pt experienced while taking lisinopril and amlodipine in past. Provided education. Pt verbalized understanding. Will continue to monitor.

## 2024-10-20 NOTE — NURSING NOTE
Patient resting in bed throughout the evening. He is calm and cooperative. Did not eat HS snack. He refused his HS melatonin and states he does not need it. A short while later, patient appeared to be sleeping. Appears comfortable. Respirations even and unlabored.

## 2024-10-20 NOTE — H&P
"Psychiatric Evaluation - Behavioral Health   Santiago Monterroso 44 y.o. male MRN: [id number]  Unit/Bed#: [facility] 220-01 Encounter: [id number]    Assessment   Principal Problem:    Unspecified mood (affective) disorder (HCC)  Active Problems:    Methamphetamine use disorder, mild, abuse (HCC)    Alcohol use disorder, moderate, dependence (HCC)      Assessment    Santiago Monterroso is a 44 y.o.  with a past medical history of HTN and a past psychiatric history of alcohol use disorder, methamphetamine use disorder who presents to the [facility] after initially being found by police for wandering the woods and symptoms of psychosis. On assessment the patient was calm, cooperative and euthymic in mood. He is currently denying suicidal and homicidal ideation at this time. He demonstrates fair insight and judgement. His current diagnosis is unspecified mood disorder. We will continue to monitor and make appropriate medication changes as needed. The plan is for the patient to get stabilized and be transferred to [facility] for dual diagnosis treatment.       Plan    Admission labs evaluated, see detailed labs below.  Collaborate with collaterals for baseline assessment and disposition.  Currently on a 201 commitment   Could consider starting Naltrexone if patient is interested   Signed a 72 hour notice  Promote patient participation in therapeutic milieu.  Medical management by primary team.    Risks, benefits and possible side effects of Medications:   Risks, benefits, and possible side effects of medications explained to patient and patient verbalizes understanding.      Chief Complaint: \"I made a bad decision and decided to use with my friend\"    History of Present Illness     Santiago Monterroso is a 44 y.o. male, presenting to [facility], with a past psychiatric history of alcohol use disorder, methamphetamine use disorder, subsequent to being found by police wandering in the " woods and exhibiting symptoms of psychosis. The patient tells this writer that his symptoms have since resolved.  He believes that he did methamphetamines that was laced and caused him to have hallucinations.  The patient states that he thought he was going to a [facility] recovery group for men but ended up hanging out with one of his friends who used to use.  He decided to use with him and ended up wandering off to the woods and was found by police. Per chart review the patient reporting going for a hike in the forest and says he walked around 50 miles and remembers finding himself shirtless and believes he had frost bite on his feet. He has already signed a 72 hour notice. As he reflects on what brought him to the hospital he says he made a bad decision and is paying for it. He says he was not angry about the hallucinogenic experience as he had flashbacks about prior wrongdonig and says he wants to work on forgiving people and hopes to be forgiven by others.  He is future oriented and expressed a desire to spend more time with his daughters and admits to make excuses in the past did not hang out with him as much because they like to shop and he does not like to do those things.  In regard to his depressive symptoms he denies any symptoms of depression and anxiety.  He has never seen a psychiatrist or been diagnosed with any psychiatric diagnoses before.  When asked about his substance use he does admit to drinking alcohol daily and reports drinking around 3 glasses of wine as well as a 6 pack of beer.  He states he is unable to just have 1 or 2 and will drink till he is drunk. The patient also wants to work on reducing his use as he is asking his partner to remove all alcohol from his house.  When asked why he drinks he says he does it because he enjoys it.  The patient expressed continued desire to attend [facility] for rehab.  He also has external pressures as he mentions that his job is aware he is here and  told him that if he does not go to rehab he will not have a job when he returns.  .    Stressors:  Substance use and dependence  Job and family pressure to attend rehab for his substance use disorder    Patient Strengths/Assets: ability for insight, average or above intelligence, cooperative, communication skills, family ties, financial means, general fund of knowledge, motivation for treatment/growth, sense of humor      Patient Barriers/Limitations: substance abuse     Psychiatric Review Of Systems:  Sleep changes: Denies  Appetite changes: Denies   Weight changes: Denies  Energy/anergy: Denies  Interest/pleasure/anhedonia: Denies  Somatic symptoms:Denies  Anxiety/panic: Denies  Ana: Denies  Guilty/hopeless: Denies  Self injurious behavior/risky behavior: Denies  Suicidal ideation: Denies  Homicidal ideation: Denies  Auditory hallucinations: Denies  Visual hallucinations: Denies  Other hallucinations: Denies  Delusional thinking: Denies  Eating disorder history: Denies  Obsessive/compulsive symptoms: Denies      Historical Information     Past Psychiatric History:   Past Inpatient Psychiatric Treatment:   None  Past Outpatient Psychiatric Treatment:    None  Past Suicide Attempts: Denies  Past Violent Behavior: Denies  Past Psychiatric Medication Trials: Denies     Substance Abuse History:  Social History       Tobacco History       Smoking Status  Never      Smokeless Tobacco Use  Never              Alcohol History       Alcohol Use Status  Never              Drug Use       Drug Use Status  Yes Types  Methamphetamines              Sexual Activity       Sexually Active  Not Asked              Activities of Daily Living    Not Asked                   I have assessed this patient for substance use within the past 12 months    Alcohol use:  admits to heavy alcohol use, per chart review reports 3 glasses of wine per day and a 6 pack of beer per day  Recreational drug use:   Cocaine:  Denies  Heroin:   Denies  Marijuana:  Denies  Other drugs: Uses crystal meth, last time he used crystal meth prior to this episode at the beginning of the year. He reports sporadic use with his friend. He believes it was laced    Longest clean time:  Reports being clean for 7 years  History of Inpatient/Outpatient rehabilitation program: no  Smoking history: denies use  Use of caffeine:  1 cup of coffee day    Family Psychiatric History:   Psychiatric Illness:  Unremarkable per patient   Substance Abuse:  Unremarkable per patient  Suicide Attempts:  Unremarkable per patient    Social History:  Education: reports being home schooled and went to 6th-7th grade. He got a diploma from home school education in [place]. He then went to 3 yers of [employer]. He has certifications for construction, safety, [employer], confined spaces  Learning Disabilities: none  Marital History:  for 7 years  Children: none with his current wife, but has 2 children that do not live which him (24 yo daughter and 15 yo daughter)   Living Arrangement:  lives in home girlfriend  Occupational History:   at [employer] via [employer], 6 years. His job said that if he does not go voluntarily he will lose his job  Functioning Relationships: good support system  Legal History: none   History: None    Traumatic History:   Abuse: none  Other Traumatic Events: none     Past Medical History:  History of Seizures:Denies  History of Head injury with loss of consciousness: Denies     Past Medical History:   Diagnosis Date    Alcohol abuse     Hypertension     Nicotine addiction        History reviewed. No pertinent surgical history.    Medical Review Of Systems:  Pertinent items are noted in HPI.    Meds/Allergies   all current active meds have been reviewed, current meds:   Current Facility-Administered Medications:     aluminum-magnesium hydroxide-simethicone (MAALOX) oral suspension 30 mL, Q4H PRN    benztropine (COGENTIN) injection 1 mg, Q4H  PRN Max 6/day    benztropine (COGENTIN) tablet 1 mg, Q4H PRN Max 6/day    bisacodyl (DULCOLAX) rectal suppository 10 mg, Daily PRN    cloNIDine (CATAPRES) tablet 0.1 mg, Q6H PRN    hydrOXYzine HCL (ATARAX) tablet 50 mg, Q6H PRN Max 4/day **OR** diphenhydrAMINE (BENADRYL) injection 50 mg, Q6H PRN    folic acid (FOLVITE) tablet 1 mg, Daily    hydroCHLOROthiazide tablet 25 mg, Daily    hydrOXYzine HCL (ATARAX) tablet 100 mg, Q6H PRN Max 4/day **OR** LORazepam (ATIVAN) injection 2 mg, Q6H PRN    hydrOXYzine HCL (ATARAX) tablet 25 mg, Q6H PRN Max 4/day    ibuprofen (MOTRIN) tablet 400 mg, Q4H PRN    ibuprofen (MOTRIN) tablet 600 mg, Q6H PRN    ibuprofen (MOTRIN) tablet 800 mg, Q8H PRN    melatonin tablet 3 mg, HS PRN    melatonin tablet 6 mg, HS    multivitamin-minerals (CENTRUM) tablet 1 tablet, Daily    nicotine (NICODERM CQ) 21 mg/24 hr TD 24 hr patch 1 patch, Daily    nicotine polacrilex (NICORETTE) gum 4 mg, Q2H PRN    OLANZapine (ZyPREXA) tablet 5 mg, Q4H PRN Max 3/day **OR** OLANZapine (ZyPREXA) IM injection 2.5 mg, Q4H PRN Max 3/day    OLANZapine (ZyPREXA) tablet 5 mg, Q3H PRN Max 3/day **OR** OLANZapine (ZyPREXA) IM injection 5 mg, Q3H PRN Max 3/day    OLANZapine (ZyPREXA) tablet 2.5 mg, Q4H PRN Max 6/day    polyethylene glycol (MIRALAX) packet 17 g, Daily PRN    propranolol (INDERAL) tablet 10 mg, Q8H PRN    senna-docusate sodium (SENOKOT S) 8.6-50 mg per tablet 1 tablet, Daily PRN    thiamine tablet 100 mg, Daily, and PTA meds:   Prior to Admission Medications   Prescriptions Last Dose Informant Patient Reported? Taking?   acetaminophen (TYLENOL) 325 mg tablet 10/18/2024 at 2124 Other (Specify), Outside Facility (Specify) No Yes   Sig: Take 2 tablets (650 mg total) by mouth every 6 (six) hours as needed for mild pain, moderate pain, fever or headaches for up to 2 doses   amLODIPine (NORVASC) 5 mg tablet Not Taking Other (Specify), Outside Facility (Specify), Self No No   Sig: Take 1 tablet (5 mg total) by  mouth daily   Patient not taking: Reported on 10/19/2024   folic acid (FOLVITE) 1 mg tablet 10/19/2024 at 0836 Other (Specify), Outside Facility (Specify) Yes Yes   Sig: Take 1 mg by mouth daily   melatonin 3 mg  Outside Facility (Specify), Other (Specify) Yes Yes   Sig: Take 6 mg by mouth daily at bedtime   multivitamin (THERAGRAN) TABS 10/19/2024 at 0836 Other (Specify), Outside Facility (Specify) Yes Yes   Sig: Take 1 tablet by mouth daily   thiamine 100 MG tablet 10/19/2024 at 0836 Outside Facility (Specify), Other (Specify) Yes Yes   Sig: Take 100 mg by mouth daily      Facility-Administered Medications: None     No Known Allergies    Objective   Vital signs in last 24 hours:  Temp:  [97.5 °F (36.4 °C)-97.7 °F (36.5 °C)] 97.5 °F (36.4 °C)  HR:  [56-73] 56  BP: (156-188)/() 156/84  Resp:  [16-17] 16  SpO2:  [99 %-100 %] 99 %  O2 Device: None (Room air)    No intake or output data in the 24 hours ending 10/20/24 1248    Mental Status Evaluation:  Appearance:  age appropriate, casually dressed, dressed appropriately, adequate grooming, looks stated age   Behavior:  pleasant, cooperative, calm   Speech:  normal rate, normal volume, normal pitch   Mood:  euthymic   Affect:  constricted   Language: naming objects and repeating phrases   Thought Process:  organized, logical, coherent, goal directed, linear, normal rate of thoughts   Associations: intact associations   Thought Content:  no overt delusions   Perceptual Disturbances: no auditory hallucinations, no visual hallucinations   Risk Potential: Suicidal ideation - None at present  Homicidal ideation - None at present  Potential for aggression - No   Sensorium:  oriented to person, place, time/date, and situation   Memory:  recent and remote memory grossly intact   Consciousness:  alert and awake   Attention/Concentration: attention span and concentration are age appropriate   Intellect: average   Fund of Knowledge: awareness of current events: yes    Insight:  fair   Judgment: fair   Muscle Strength Muscle Tone: normal  normal   Gait/Station: normal gait/station   Motor Activity: no abnormal movements     Laboratory Results: I have personally reviewed all pertinent laboratory/tests results    Results from the past 24 hours:   Recent Results (from the past 24 hour(s))   ECG 12 lead    Collection Time: 10/19/24  6:02 PM   Result Value Ref Range    Ventricular Rate 62 BPM    Atrial Rate 62 BPM    IA Interval 144 ms    QRSD Interval 92 ms    QT Interval 404 ms    QTC Interval 410 ms    P Axis 35 degrees    QRS Axis 44 degrees    T Wave Axis 34 degrees   TSH, 3rd generation with Free T4 reflex    Collection Time: 10/20/24  6:01 AM   Result Value Ref Range    TSH 3RD GENERATON 0.834 0.450 - 4.500 uIU/mL   UA (URINE) with reflex to Scope    Collection Time: 10/20/24  6:01 AM   Result Value Ref Range    Color, UA Straw Yellow, Straw    Clarity, UA Clear Hazy, Clear    Specific Gravity, UA 1.020 >1.005 - <1.030    pH, UA 6.0 5.0, 5.5, 6.0, 6.5, 7.0, 7.5    Leukocytes, UA Negative Negative    Nitrite, UA Negative Negative    Protein, UA Negative Negative, Interference- unable to analyze mg/dl    Glucose, UA Negative Negative mg/dl    Ketones, UA Negative Negative mg/dl    Urobilinogen, UA 0.2 0.2, 1.0 E.U./dl E.U./dl    Bilirubin, UA Negative Negative    Occult Blood, UA Negative Negative   Rapid drug screen, urine    Collection Time: 10/20/24  6:01 AM   Result Value Ref Range    Amph/Meth UR Negative Negative    Barbiturate Ur Negative Negative    Benzodiazepine Urine Negative Negative    Cocaine Urine Negative Negative    Methadone Urine Negative Negative    Opiate Urine Negative Negative    PCP Ur Negative Negative    THC Urine Negative Negative    Oxycodone Urine Negative Negative    Fentanyl Urine Negative Negative    HYDROCODONE URINE Negative Negative   Comprehensive metabolic panel    Collection Time: 10/20/24  6:01 AM   Result Value Ref Range    Sodium 139 135  - 147 mmol/L    Potassium 4.1 3.5 - 5.3 mmol/L    Chloride 105 96 - 108 mmol/L    CO2 30 21 - 32 mmol/L    ANION GAP 4 4 - 13 mmol/L    BUN 15 5 - 25 mg/dL    Creatinine 1.05 0.60 - 1.30 mg/dL    Glucose 98 65 - 140 mg/dL    Glucose, Fasting 98 65 - 99 mg/dL    Calcium 9.5 8.4 - 10.2 mg/dL    AST 34 13 - 39 U/L    ALT 41 7 - 52 U/L    Alkaline Phosphatase 31 (L) 34 - 104 U/L    Total Protein 6.3 (L) 6.4 - 8.4 g/dL    Albumin 3.7 3.5 - 5.0 g/dL    Total Bilirubin 0.78 0.20 - 1.00 mg/dL    eGFR 85 ml/min/1.73sq m   Magnesium    Collection Time: 10/20/24  6:01 AM   Result Value Ref Range    Magnesium 2.2 1.9 - 2.7 mg/dL   Phosphorus    Collection Time: 10/20/24  6:01 AM   Result Value Ref Range    Phosphorus 3.5 2.7 - 4.5 mg/dL   Lipid panel    Collection Time: 10/20/24  6:01 AM   Result Value Ref Range    Cholesterol 185 See Comment mg/dL    Triglycerides 116 See Comment mg/dL    HDL, Direct 49 >=40 mg/dL    LDL Calculated 113 (H) 0 - 100 mg/dL    Non-HDL-Chol (CHOL-HDL) 136 mg/dl       Imaging Studies: CT head wo contrast    Result Date: 10/17/2024  Narrative: CT BRAIN - WITHOUT CONTRAST INDICATION:   AMS new onset. COMPARISON:  None. TECHNIQUE:  CT examination of the brain was performed.  Multiplanar 2D reformatted images were created from the source data. Radiation dose length product (DLP) for this visit:  822 mGy-cm .  This examination, like all CT scans performed in the [facility], was performed utilizing techniques to minimize radiation dose exposure, including the use of iterative reconstruction and automated exposure control. IMAGE QUALITY:  Diagnostic. FINDINGS: PARENCHYMA:No intracranial mass, mass effect or midline shift. No CT signs of acute infarction.  No acute parenchymal hemorrhage. Evidence of a partially empty sella. VENTRICLES AND EXTRA-AXIAL SPACES:  Normal for the patient's age. VISUALIZED ORBITS: Normal visualized orbits. PARANASAL SINUSES: Normal visualized paranasal sinuses.  CALVARIUM AND EXTRACRANIAL SOFT TISSUES: Normal.     Impression: No acute intracranial abnormality. Workstation performed: [id number]       Code Status: Level 1 - Full Code  Advance Directive and Living Will: <no information>    Suicide/Homicide Risk Assessment:  Risk of Harm to Self:   The following ratings are based on assessment at the time of the interview  Nursing Suicide Risk Assessment Last 24 hours: C-SSRS Risk (Since Last Contact)  Calculated C-SSRS Risk Score (Since Last Contact): No Risk Indicated  Demographic risk factors include: ,  status, male  Historical Risk Factors include: alcohol use, drug use, substance use, history of substance use  Current Specific Risk Factors include: diagnosis of mood disorder  Protective Factors: no current suicidal plan or intent, ability to communicate with staff on the unit, able to contract for safety on the unit, ability to make plans for the future, being a parent  Weapons/Firearms: gun. The following steps have been taken to ensure weapons are properly secured: locked, secured  Based on today's assessment, Santiago presents the following risk of harm to self: low    Risk of Harm to Others:  The following ratings are based on assessment at the time of the interview  Nursing Homicide Risk Assessment: Violence Risk to Others: Denies within past 6 months  Demographic Risk Factors include: male.  Historical Risk Factors include: drug abuse, alcohol abuse, history of substance use.  Current Specific Risk Factors include: none  Protective Factors: no current homicidal ideation, able to communicate with staff on the unit, compliant with unit milieu, follows staff redirection, compliant with treatment  Based on today's assessmentSantiago presents the following risk of harm to others: low    The following interventions are recommended: continued hospitalization on locked unit     Risks / Benefits of Treatment:     Risks, benefits, and possible side effects of  medications explained to patient. The patient verbalizes understanding and agreement for treatment.     Counseling / Coordination of Care:     Patient's presentation on admission and proposed treatment plan discussed with treatment team.  Diagnosis, medication changes and treatment plan reviewed with patient.  Recent stressors discussed with patient..  Events leading to admission reviewed with patient.  Importance of medication and treatment compliance reviewed with patient.          Inpatient Psychiatric Certification:     Certification: Based upon physical, mental and social evaluations, I certify that inpatient psychiatric services are medically necessary for this patient for a duration of 7 midnights for the treatment of Unspecified mood (affective) disorder (HCC)    This note has been constructed using a voice recognition system.  There may be translation, syntax, or grammatical errors. If you have any questions, please contact the dictating provider.    Haja Lima MD  Department of Psychiatry and Behavioral Health

## 2024-10-20 NOTE — PROGRESS NOTES
"Progress Note - Santiago Monterroso 44 y.o. male MRN: [id number]    Unit/Bed#: [facility] 220-01 Encounter: [id number]        Subjective:   Patient seen and examined at bedside after reviewing the chart and discussing the case with the caring staff.      Patient examined at bedside.  Patient blood pressure this morning was found to be high 178/95.  Patient has prior history of chest pain episodes but this time patient denied any chest pain, palpitations, headache or visual changes.    Patient's labs including vitamin D and B12 levels are still pending.    Physical Exam   Vitals: Blood pressure 156/84, pulse 56, temperature 97.5 °F (36.4 °C), temperature source Temporal, resp. rate 16, height 5' 9\" (1.753 m), weight 89.4 kg (197 lb 3.2 oz), SpO2 99%.,Body mass index is 29.12 kg/m².  Constitutional: He appears well-developed.   HEENT: PERR, EOMI, MMM  Cardiovascular: Normal rate and regular rhythm.    Pulmonary/Chest: Effort normal and breath sounds normal.   Abdomen: Soft, + BS, NT    Assessment/Plan:  Santiago Monterroso is a(n) 44 y.o. year old male with Psychosis NOS     Cardiac with hypertension.  I will increase patient's hydrochlorothiazide 50 mg daily 10/20/2024.  Patient has also been put on clonidine 0.1 mg every 6 hours as needed for SBP > 160 and DBP > 90.  At home patient was not taking his amlodipine because of edema and also developed cough with lisinopril.  Nicotine abuse.  I will put the patient on nicotine transdermal patch 21 mg daily along with nicotine gum as needed.  Alcohol abuse.  Patient has been put on thiamine, folic acid and multivitamin.  Insomnia.  Patient is on melatonin 6 mg at bedtime.  Arthralgia/headache.  Patient may get ibuprofen as needed.  "

## 2024-10-20 NOTE — TREATMENT PLAN
TREATMENT PLAN REVIEW - Behavioral Health Santiago Monterroso 44 y.o. 1980 male MRN: [id number]    [facility] Room / Bed: RUST 220/RUST 220-01 Encounter: [id number]          Admit Date/Time:  10/19/2024  2:44 PM    Treatment Team:   MD Tobias Bob MD Morgan Coles Elaina Moretz Kiley Vanhorn Kelly L Strohl, RN William Widarsono    Diagnosis: Principal Problem:    Unspecified mood (affective) disorder (HCC)  Active Problems:    Methamphetamine use disorder, mild, abuse (HCC)    Alcohol use disorder, moderate, dependence (HCC)      Patient Strengths/Assets: ability for insight, average or above intelligence, cooperative, communication skills, family ties, financial means, general fund of knowledge, motivation for treatment/growth, sense of humor    Patient Barriers/Limitations: substance abuse    Short Term Goals: decrease in depressive symptoms, decrease in anxiety symptoms, improvement in ability to express basic needs, improvement in insight, improvement in reasoning ability, improvement in self care, sleep improvement, improvement in appetite, mood stabilization, increase in group attendance, increase in socialization with peers on the unit, acceptance of need for psychiatric treatment, acceptance of psychiatric medications    Long Term Goals: improvement in depression, improvement in anxiety, improved insight, able to express basic needs, acceptance of need for psychiatric treatment, adequate self care, adequate sleep, adequate appetite, adequate oral intake, appropriate interaction with peers, appropriate interaction with family    Progress Towards Goals: starting psychiatric medications as prescribed    Recommended Treatment: medication management, patient medication education, group therapy, milieu therapy, continued Behavioral Health psychiatric evaluation/assessment process    Treatment Frequency: daily medication monitoring, group  and milieu therapy daily, monitoring through interdisciplinary rounds, monitoring through weekly patient care conferences    Expected Discharge Date:  TBD    Discharge Plan:  Discharge to [facility]    Treatment Plan Created/Updated By: Haja Lima MD

## 2024-10-20 NOTE — NURSING NOTE
Patient presents as pleasant and appropriate upon interaction. Patient is visible in milieu for meals and group. Patient denies SI/HI/AVH. Patient is compliant with medications. No acute behaviors noted. Q 7 min safety checks maintained.

## 2024-10-21 LAB — TREPONEMA PALLIDUM IGG+IGM AB [PRESENCE] IN SERUM OR PLASMA BY IMMUNOASSAY: NORMAL

## 2024-10-21 PROCEDURE — 99232 SBSQ HOSP IP/OBS MODERATE 35: CPT | Performed by: HOSPITALIST

## 2024-10-21 RX ORDER — ERGOCALCIFEROL 1.25 MG/1
50000 CAPSULE, LIQUID FILLED ORAL WEEKLY
Status: DISCONTINUED | OUTPATIENT
Start: 2024-10-21 | End: 2024-10-22 | Stop reason: HOSPADM

## 2024-10-21 RX ADMIN — NICOTINE POLACRILEX 4 MG: 4 GUM, CHEWING BUCCAL at 17:34

## 2024-10-21 RX ADMIN — FOLIC ACID 1 MG: 1 TABLET ORAL at 08:45

## 2024-10-21 RX ADMIN — ERGOCALCIFEROL 50000 UNITS: 1.25 CAPSULE, LIQUID FILLED ORAL at 12:53

## 2024-10-21 RX ADMIN — THIAMINE HCL TAB 100 MG 100 MG: 100 TAB at 08:45

## 2024-10-21 RX ADMIN — NICOTINE POLACRILEX 4 MG: 4 GUM, CHEWING BUCCAL at 15:02

## 2024-10-21 RX ADMIN — NICOTINE POLACRILEX 4 MG: 4 GUM, CHEWING BUCCAL at 09:59

## 2024-10-21 RX ADMIN — Medication 1 TABLET: at 08:45

## 2024-10-21 RX ADMIN — NICOTINE POLACRILEX 4 MG: 4 GUM, CHEWING BUCCAL at 20:09

## 2024-10-21 RX ADMIN — HYDROCHLOROTHIAZIDE 50 MG: 25 TABLET ORAL at 08:45

## 2024-10-21 RX ADMIN — NICOTINE POLACRILEX 4 MG: 4 GUM, CHEWING BUCCAL at 07:17

## 2024-10-21 RX ADMIN — NICOTINE POLACRILEX 4 MG: 4 GUM, CHEWING BUCCAL at 12:54

## 2024-10-21 NOTE — PROGRESS NOTES
"Psycho Social  Patient was admitted to [facility] on 10/19/24 under a 201, Voluntary Commitment with the following sx as noted during pre-admission ED Crisis Eval:       Santiago Monterroso is a 43 y/o male, diagnosed with no known diagnoses who presented to ED via police car due to:          Psychiatric Evaluation       Patient arrives with EMS and PSP with reports of being out in the woods without a shirt for 24-48 hours. Patient reports being at a retreat for drug and alcohol detox and states he went on a hike. At some point a missing persons report was filed. Pt was found, and PSP is seeking to petition a    Pt appears guarded. Oriented to self, day, year. Pt presents with minimal eye contact.  Pt states that a couple of days ago he decided to take a hike in the forest and walked 50 plus miles. Pt states he found himself shirtless, and most likely have frost bite on his foot. Pt states that 2 days ago he took \"hallucinotic drug\" and that he does not remember much since then. Pt states his wife was aware where he was but contacted 911 and reported him as a missing person. At some point today while pt was walking state police stopped to talk to pt and brought him in to ED for evaluation.   Pt denies SI, HI, self harming. Pt denies current hallucinations. No past or present mental health services reported. No appetite or sleep problems reported. Pt would like to be discharge home at this time.         On interview, the patient was alert, oriented, calm, cooperative, pleasant and agreeable to provide requested information. He indicated interest in D&A only, stating he has no psychiatric history and made the mistake ot using Methamphetamine for the first time, with primary drug of choice notes as alcohol. Stated he is motivated to Rehab treatment via direct admission from current inpatient stay.    Current SI: Denied  Current HI: Denied  AVH:            Denied  Depression: Denied  Anxiety:       Denied  Strengths:    " "Cooperative, employment, housing, family ties, reasoning ability, able to negotiate needs  Stressors/Limitations: Substance abuse history, No current provider  Coping skills: Outdoor activities/ hunting/ fishing; cooking  HX Mental Health: None  Past Hospitalizations: None  Medication Compliance:  On no currently prescribed medications  SA/SI in last 12 months: Denied  HI/violence towards others in last 12  months: Denied  Access to Firearms: Firearms in the home are reportedly being removed by the family  Hx abuse/trauma: Denied  Family HX Mental Health: Denied  Family HX Suicide/Homicide: Denied  Family HX Substance Abuse: Denied  Family HX Dementia: Denied  Substance Abuse: Reported having started to abuse alcohol, following the divorce from his wife 6 years ago. Reported drinking until intoxicated, \"possibly\" as much as a bottle or 2 or wine at a time.  Smoking Cessation: Denied  Legal Issues: No issues  Marital Status:  for 23 years prior to divorce 6 years ago. 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   HX:  None  Yazidi Preference:  Yazidism  Cultural needs: None reported  Financial: Approx. $3,000/monthly  POA/guardianship/advanced: directives: No  Pharmacy: [facility]    Housing Stability-Dispo/211: No issue  Transportation: Drives  Food Insecurity:  None  Intimate Partner Violence:  Denied  Utilities:  No issus    Psychiatrist: Declined referral  Therapist:      Declined referral  PCP:               None presently  D&A:               Admission planned to [facility] following d/c  Case " Management: None  Family Contact:  Felicia Stapleton: [phone]     10/21/24 1102   Patient Intake   Living Arrangement House;Lives with someone   Can patient return home? Yes   Address to be Discharge to: See facesheet   Patient's Telephone Number See facesheet   Access to Firearms Yes   Describe Access to Weapons Patient reported that his guns are being removed from the home by a family member   Is there someone that can secure the firearms? Yes -  will be notified and asked to secure weapon   Type of Work    Work History Full-time   Admission Status   Status of Admission [address] [place] [address] of [facility]   Patient History   Presenting Problems Found wandering in the woods, shirtless with report of having been there 24-28 hours/ unaware of circumstances. Reported exposure conditions of potential frostbite   Treatment History No previous history   Currently in Treatment No   Medical Problems See Medical H&P   Legal Issues None reported   Probation/ Name (if applicable) NA   Substance Abuse Yes (See BH History section for detail)

## 2024-10-21 NOTE — PROGRESS NOTES
10/21/24 1300   Team Meeting   Meeting Type Tx Team Meeting   Initial Conference Date 10/21/24   Next Conference Date 11/21/24   Team Members Present   Team Members Present Physician;Nurse;   Physician Team Member Dr. Woo   Nursing Team Member Mela Silva RN   Care Management Team Member Meliza Askew RN   Patient/Family Present   Patient Present Yes   Patient's Family Present No     Initial Plan  Treatment Team met and reviewed patient strengths, limitations, coping skills, Treatment Plan and Goals; patient reported understanding and agreement and signed the Treatment Plan document. A copy was placed on the chart.

## 2024-10-21 NOTE — CASE MANAGEMENT
CAROLE placed call to patient's daughter, Isabel [phone] for family notification. The daughter was in agreement with the patient re: the priority need for D&A treatment; no concern re: psychiatric sx or risk for d/c. She stated that she has been in touch with [facility], who are holding a bed for the patient for anticipated d/c tomorrow. She provided phone number and contact for the facility for confirmation/ collaboration as needed. She was agreeable to provide transport to the facility upon patient d/c. She is aware of patient's 72 hour expiration on 10/22/24 at 1446 and will be at the hospital at 1430 for transport to the [facility] Facility for admission. The call ended mutually.    CM placed call to Shanice Hahn, [phone] for finalization. Spoke with Admission Coordinator, Pravin, who stated that the facility rec'd requested medical records for review from [facility] and were in the process of review by the Medical Review Team, after which a final eligibility determination would be made. CAROLE provided phone numbers for return call with outcome of review. The call ended mutually.

## 2024-10-21 NOTE — PROGRESS NOTES
10/21/24 0811   Team Meeting   Meeting Type Daily Rounds   Team Members Present   Team Members Present Physician;Nurse;;Occupational Therapist   Physician Team Member Russel RICE, Amna RICE, Juli PONCE   Nursing Team Member Ricardo RN   Social Work Team Member Thang GARCIA   OT Team Member Pope CLYDE   Patient/Family Present   Patient Present No   Patient's Family Present No     New 201, signed 72 on admission, missing for 2 days, found hallucinating, UDS+ for meth, on the unit denies all, believes all meth induced, family supportive, BP has been high and being monitored

## 2024-10-21 NOTE — PROGRESS NOTES
Discussed with patient: AUDIT score of 35 UDS/Identified Substance(s) used: Alcohol, Methamphetamine  Risks discussed included: Physical and Mental Health  Risks  Recommendations discussed: [facility]  Patient's response: Patient to be admitted to [facility] upon d/c.

## 2024-10-21 NOTE — PROGRESS NOTES
Progress Note - Behavioral Health   Name: Santiago Monterroso 44 y.o. male I MRN: [id number]  Unit/Bed#: -01 I Date of Admission: 10/19/2024   Date of Service: 10/21/2024 I Hospital Day: 2     Assessment & Plan  Unspecified mood (affective) disorder (HCC)  Patient reports he used meth and then wandered in the woods for an extended time, ultimately being found by police exhibiting signs of psychosis. Not currently on psychiatric medications or exhibiting signs of psychosis.  Methamphetamine use disorder, mild, abuse (HCC)  Plan is for patient to go to [facility].  Alcohol use disorder, moderate, dependence (HCC)  Plan is for patient to go to [facility].    Planned medication and treatment changes:    All current active medications have been reviewed  Encourage group therapy, milieu therapy and occupational therapy  Behavioral Health checks for safety monitoring  Patient not currently on psychiatric medications.    Behavior over the last 24 hours: unchanged.     Santiago is seen today for psychiatric follow up. Per nursing notes, patient presents as pleasant, appropriate, visible, polite, reporting he has no psychiatric history and needs rehab.  Patient remains in behavioral control. No psych prns in last 24 hours.     Today Santiago is polite, pleasant, and cooperative. He is able to discuss his admission symptoms/events leading up to meth usage, and is understanding/appreciative of care. Denies anxiety and depression, as well as denies suicidal and homicidal ideations. Appropriate affect and appears forward thinking. Able to contract for safety. Denies hallucinations and paranoia when asked, does not appear to be responding to internal stimuli. Reports that he feels guilty and that he has let his family down, however notes his family is very supportive and his daughter has been helping coordinate his treatment at a rehab facility.     He reports desire to live and gain the trust of his family  again for a mistake he made, and notes he desires to discharge straight to a rehab facility. Able to identify protective factors, and discussed the safety plan should he experience psychiatric decompensation.      Patient signed a 72-hour notice at time of admission, at this time does not appear to be in acute risk to self/others with a discharge to a safe environment. No overt kayleigh, psychosis, agitation, or delusional content elicited.    Sleep: normal  Appetite: normal  Medication side effects: No   ROS: no complaints    Mental Status Evaluation:    Appearance:  age appropriate, casually dressed, adequate grooming   Behavior:  pleasant, cooperative, calm   Speech:  normal rate and volume, clear   Mood:  euthymic   Affect:  appropriate, mood-congruent   Thought Process:  organized, logical, goal directed   Associations: intact associations   Thought Content:  no overt delusions   Perceptual Disturbances: denies auditory hallucinations when asked, does not appear responding to internal stimuli, denies visual hallucinations when asked   Risk Potential: Suicidal ideation - None  Homicidal ideation - None  Potential for aggression - No   Sensorium:  oriented to person, place, time/date, and situation   Memory:  recent and remote memory grossly intact   Consciousness:  alert and awake   Attention/Concentration: attention span and concentration are age appropriate   Insight:  fair   Judgment: fair   Gait/Station: normal gait/station   Motor Activity: no abnormal movements     Vital signs in last 24 hours:    Temp:  [98 °F (36.7 °C)] 98 °F (36.7 °C)  HR:  [68-71] 71  BP: (155-161)/(87-98) 155/98  Resp:  [16-18] 18  SpO2:  [98 %-99 %] 98 %  O2 Device: None (Room air)    Laboratory results: I have personally reviewed all pertinent laboratory/tests results    Results from the past 24 hours: No results found for this or any previous visit (from the past 24 hour(s)).    Progress Toward Goals: Patient signed a 72-hour notice  at admission, supportive family, able to contract for safety and identify protective factors , plan is for discharge to [facility] and a rehab.    Assessment & Plan   Principal Problem:    Unspecified mood (affective) disorder (HCC)  Active Problems:    Methamphetamine use disorder, mild, abuse (HCC)    Alcohol use disorder, moderate, dependence (HCC)        Current Facility-Administered Medications   Medication Dose Route Frequency Provider Last Rate    aluminum-magnesium hydroxide-simethicone  30 mL Oral Q4H PRN Haja Lima MD      benztropine  1 mg Intramuscular Q4H PRN Max 6/day Haja Lima MD      benztropine  1 mg Oral Q4H PRN Max 6/day Haja Lima MD      bisacodyl  10 mg Rectal Daily PRN Haja Lima MD      cloNIDine  0.1 mg Oral Q6H PRN Tobias Rodriguez MD      hydrOXYzine HCL  50 mg Oral Q6H PRN Max 4/day Haja Lima MD      Or    diphenhydrAMINE  50 mg Intramuscular Q6H PRN Haja Lima MD      ergocalciferol  50,000 Units Oral Weekly Jazlyn Dunn PA-C      folic acid  1 mg Oral Daily Haja Lima MD      hydroCHLOROthiazide  50 mg Oral Daily Tobias Rodriguez MD      hydrOXYzine HCL  100 mg Oral Q6H PRN Max 4/day Haja Lima MD      Or    LORazepam  2 mg Intramuscular Q6H PRN Haja Lima MD      hydrOXYzine HCL  25 mg Oral Q6H PRN Max 4/day Haja Lima MD      ibuprofen  400 mg Oral Q4H PRN Haja Lima MD      ibuprofen  600 mg Oral Q6H PRN Haja Lima MD      ibuprofen  800 mg Oral Q8H PRN Haja Lima MD      melatonin  3 mg Oral HS PRN Haja Lima MD      melatonin  6 mg Oral HS Haja Lima MD      multivitamin-minerals  1 tablet Oral Daily Haja Lima MD      nicotine polacrilex  4 mg Oral Q2H PRN Tobias Rodriguez MD      OLANZapine  5 mg Oral Q4H PRN Max 3/day Haja Lima MD      Or    OLANZapine  2.5 mg Intramuscular Q4H PRN Max 3/day Haja Lima MD      OLANZapine  5 mg Oral Q3H PRN Max 3/day Haja Lima MD      Or    OLANZapine  5 mg  Intramuscular Q3H PRN Max 3/day Haja Lima MD      OLANZapine  2.5 mg Oral Q4H PRN Max 6/day Haja Lima MD      polyethylene glycol  17 g Oral Daily PRN Haja Lima MD      propranolol  10 mg Oral Q8H PRN Haja Lima MD      senna-docusate sodium  1 tablet Oral Daily PRN Haja Lima MD      thiamine  100 mg Oral Daily Haja Lima MD       Risks / Benefits of Treatment:    Risks, benefits, and possible side effects of medications explained to patient and patient verbalizes understanding and agreement for treatment.    Counseling / Coordination of Care:    Patient's progress discussed with staff in treatment team meeting.  Medication education provided to patient.  Group attendance encouraged.    Jamison Bustos PA-C 10/21/24

## 2024-10-21 NOTE — NURSING NOTE
Patient present in the community. He is polite and appropriate in conversation. He reports a stable mood and denies all psychiatric symptoms. He is positive and hopeful in regards to his discharge tomorrow. Able to make needs known.

## 2024-10-21 NOTE — ASSESSMENT & PLAN NOTE
Patient reports he used meth and then wandered in the woods for an extended time, ultimately being found by police exhibiting signs of psychosis. Not currently on psychiatric medications or exhibiting signs of psychosis.

## 2024-10-21 NOTE — ED NOTES
MERY received fax from Clinical Services  of [facility]    Insurance Authorization for admission:   Phone call placed to [facility]  Phone number: [phone]     Spoke to Auth received via fax     6 days approved.  Level of care: 201  Review on 10/22/2024   Authorization # [id number]        Eligibility Verification System checked - ([phone]).  Online system / automated system indicates: **    Insurance Authorization for Transportation:    Phone call placed to **.   Phone number **.   Spoke to **.   Authorization #: **    FAUSTO, MERY

## 2024-10-21 NOTE — Clinical Note
Insurance Authorization for admission:   Phone call placed to [facility]   Phone number: [phone]    Spoke to Yesi     ** days approved.  Level of care: 201/IP.  Review on **.   Authorization # **.        Eligibility Verification System checked - ([phone]).  Online system / automated system indicates: **    Insurance Authorization for Transportation:    Phone call placed to **.   Phone number **.   Spoke to **.   Authorization #: **

## 2024-10-21 NOTE — DISCHARGE INSTR - OTHER ORDERS
You are being discharged to [facility]. Your home address is [address] [place] [address]. Patient phone: [phone]     DUE TO SETTING UP YOUR INPATIENT STAY AT REHAB... YOU DECLINED THE NEED ALL OUTPATIENT MENTAL HEALTH TREATMENT OPTIONS, A MEDICATION MANAGEMENT REFERRAL, A CASE MANAGEMENT REFERRAL, DRUG & ALCOHOL TREATMENT, AND A PRIMARY CARE PROVIDER APPOINTMENT.    Triggers you have identified during your hospitalization that led to your admission include a distressed mood and ineffective coping skills. Coping skills you have identified during your hospitalization include music and art therapy. If you are unable to deal with your distressed mood alone please contact Felicia Monterroso (Daughter) [phone] . If that is not effective and you continue to have a distressed mood, are overwhelmed, or are in crisis please contact (Crisis #) [facility] 1 [phone], dial 911 or go to the nearest emergency center.      *[facility] Crisis Hotline: 1 353.182.7342  *National Suicide Prevention Lifeline:  1-729.433.1278  *Alcohol Anonymous: 260.856.9247  *[place] Drug & Alcohol Commission: (628) 256-4286  *National [place] on Mental Illness (SMITA) HELPLINE: 197.281.8318/Website: www.smita.org  *Substance Abuse and Mental Health Services Administration([facility]) National Helpline, which is a confidential, free, 24-hour-a-day, 365-day-a-year, information service for individuals and family members facing mental health and/or substance use disorders. This service provides referrals to local treatment facilities, support groups, and community-based organizations. Callers can also order free publications and other information.  Call 1-998.533.7038/Website: www.[facility].gov  *United Way 2-1-1: This is a toll free, confidential, 24-hour-a-day service which connects you to a community  in your area who can help you find services and resources that are available to you locally  and provide critical services that can improve and save lives.  Call: 211  /Website: http://www.[employer].org/       Tamika, or Elenita, our Behavioral Health Nurse Navigators, will be calling you after your discharge, on the phone number that you provided.  They will be available as an additional support, if needed.   If you wish to speak with one of them, you may contact Tamika at [phone] or Elenita at [phone]      For Substance Abuse treatment Assessment:  [facility] Providers    [facility] (PATH)  [address] [place] [address]  Phone: [phone]      [facility] Outpatient  [facility], [address]  [place] [address]  New Admissions [phone]  Local office [phone]-UQ-PFLAG/[facility] is having monthly PFLAG Meetings the first Wednesday of every month at 7:30PM at the [facility] ([address] [place] [address]).    For more information call:  Sujata Singleton at [phone] or Javier Sun at [phone]  You can also contact the [facility] [place] in [facility] for resources.        [facility]    We have a 13,000 square foot facility in the [facility] serving as a center for our community!  We are conveniently located at [address] [place] [address], adjacent to the Community Parking Deck at [facility] and [facility] (which also provides free bicycle parking) and within close walking distance to [facility].  [facility] provides cultural, education, and health promotion programs and direct services to strengthen and support the LGBT community across the [facility].  Go to their website at http://www.[facility].org      LGBT Infoline   INFOLINE NUMBER: ([phone]  Call our LGBT Infoline with questions about:  Existing non-discrimination laws  that may provide recourse for an employee or tenant  Businesses and organizations in the [facility] with non-discrimination policies  Referrals to LGBT-friendly professional service services or providers (including: accountants, attorneys, real estate agents, and doctors) in the area  Upcoming LGBT events in our community            SMITA      Support groups for LGBTQ+ individuals with mental health conditions meet on the 1st and 3rd Mondays of each month from 6-7:30pm at [facility] [address] [place] [address].         Location  [place] [id number]  [place] [address]       Contact Information  Yohan Beaver  Outreach &   [email or web address]  [phone]  [email or web address]    Facilitators: Leonard Mclean                 Outpatient Drug & Alcohol Treatment   The [facility] currently operates an outpatient treatment unit:  [facility] in [place], [place] as a functional unit of the [facility]  The Outpatient treatment units are licensed by the [place] Department of Drug & Alcohol Programs to provide individual and group counseling for those with substance abuse and dependency problems. The clinical staff is experienced in a variety of therapeutic modalities and provides treatment that is individualized to meet the particular needs of each person. These units are drug-free treatment programs.  The [facility] accepts most major healthcare insurance coverage plans, [place] Medical Assistance and in those cases where the consumer has no third party healthcare coverage, a liberal sliding fee schedule is utilized. The length of service and type of outpatient service provided is based on the results of the Level of Care Assessment            There are currently three treatment protocols available:  Outpatient  Intensive Outpatient  Contracted services for Partial Hospitalization  Therapy is provided in both Individual  and Group counseling formats. The Outpatient department offers individual counseling for the family members of substance abusers to address co-dependent and enabling behaviors.    Outpatient treatment services in [facility] are purchased through a fee-for-service subcontract with:  [place] [facility]  [address] [place] [address]   Phone: [phone]    [facility] Outpatient  [facility], [address]  [place] [address]   New Admissions [phone]  Local office [phone]    Outpatient treatment services in [facility] are purchased through fee-for-service subcontracts with:  [facility]   [address] [facility] [address]  [place], [place] [address]  Phone: [phone]  [facility] Outpatient  [address]  [place] [address]  Phone: New Admissions [phone] / Local Office [phone]  Outpatient treatment services are also available to [facility] residents in our [facility] Office.   March 24, 2020   [place] [employer]/Services   [facility]   **EMERGENCY FOOD PANTRY HOURS**   The [employer] food pantry will be open Mondays, Wednesdays, and Fridays from 12-1pm for a drive thru food distribution effective immediately.   Additional Food Give-Aways:   Monday, Wednesday, and Friday from noon - 1 PM. Drive through on the gym side of the building. Also, for the older or more at risk, please contact us at [phone] and we will arrange a home delivery.   www.[email or web address],   [facility]    Food Access ([employer], community meals and several restaurants that are offering free food)    Utilities ([facility] has issued a moratorium)    Housing Assistance    Case Management Support    Healthcare Systems & their Protocol    Educational Resources for Students     [email or web address]   [facility] School Meals   [employer]-   Lunches will be given out  between 11:30-1pm at both high schools and [facility] every weekday , starting Monday, March 16. Meals are for anyone 18 and younger.   [facility]-   [facility] is providing meals between 9am-12pm starting Tuesday, March 17 at both high schools and at [facility] [employer]. Bagged lunches and breakfast items will be provided for each child while supplies last. Families can drive-up, and food will be brought to the vehicles. They can visit any school regardless of which school their child attends. Visitors will not be allowed to enter the school buildings.   [place] [facility], operated by [facility]-   Information will be provided as information becomes available   Roberto Tatum   The [place] restaurant is providing free meals to those in need, with the help of the [facility] .

## 2024-10-21 NOTE — NURSING NOTE
Patent has been visible in the milieu this morning. He is calm and cooperative. Appropriate in conversation. He is compliant with his scheduled medications. His appetite is good. He denies anxiety and depression. Denies Si/Hi and hallucinations. He is able to make his needs known and offers no current complaints/ concerns. He is up and ambulatory without difficulty. He is able to make his needs known. Plan of care continues. Q7 minute safety checks in progress.

## 2024-10-21 NOTE — PROGRESS NOTES
Met with Douglas completed his admission self assessment. He noted he made a mistake by using meth. He plans to go into rehab due to alcohol and now meth use which he has not used since his youth. He is  due to his wife's infidelity. He is involved with a woman who lives with him who is alcoholic. He knows he needs to end that relationship. He is [place] and his portia assists him. He denies having any mental health issues and trauma. He has many things he enjoys and a support system.

## 2024-10-21 NOTE — NURSING NOTE
Patient observed in the milieu, withdrawn to self. He is bright, pleasant, and polite during interaction. He reports some mild frustration with admission, states he has no hx of psychiatric diagnosis, states he really needs inpatient rehab and hopes to discharge there tomorrow. He denies depression, anxiety, SI/HI and hallucinations. Denies needs or concerns at this time. Q7 minute checks ongoing.

## 2024-10-21 NOTE — PROGRESS NOTES
Met with Douglas completed his relapse prevention. He was able to  identify his protective factors, warning signs, stressors, coping skills and support system. We reviewed the community supports and he has made arrangement to go to rehab directly form the hospital.

## 2024-10-21 NOTE — PROGRESS NOTES
"Progress Note - Santiago Monterroso 44 y.o. male MRN: [id number]    Unit/Bed#: [facility] 220-01 Encounter: [id number]        Subjective:   Patient seen and examined at bedside after reviewing the chart and discussing the case with the caring staff.      Patient examined at bedside.  Patient denies any acute symptoms.     Labs normal other than low Vitamin D.     Physical Exam   Vitals: Blood pressure 155/98, pulse 71, temperature 98 °F (36.7 °C), temperature source Temporal, resp. rate 18, height 5' 9\" (1.753 m), weight 89.4 kg (197 lb 3.2 oz), SpO2 98%.,Body mass index is 29.12 kg/m².  Constitutional: He appears well-developed.   HEENT: PERR, EOMI, MMM  Cardiovascular: Normal rate and regular rhythm.    Pulmonary/Chest: Effort normal and breath sounds normal.   Abdomen: Soft, + BS, NT    Assessment/Plan:  Santiago Monterroso is a(n) 44 y.o. year old male with Psychosis NOS     Hypertension.  Incr HCTZ to 50 mg daily 10/20.  Clonidine 0.1 mg every 6 hours as needed for SBP > 160 and DBP > 90.  Patient does not tolerate amlodipine (edema) or lisinopril (cough).  URIAH.  Resolved with IV fluids at previous hospital.   Elevated CK.  Level 1,595 on 10/18.  Continue to trend down.  Nicotine abuse.  NRT.  Alcohol abuse.  Patient has been put on thiamine, folic acid and multivitamin.  Insomnia.  Patient is on melatonin 6 mg at bedtime.  Arthralgia/headache.  Patient may get ibuprofen as needed.  Vitamin D deficiency.  Level 17.0 ng.mL on 10/20.  Start patient on weekly Vit D2 supplement x 8 weeks followed by daily supplement.     The patient was discussed with Dr. Rodriguez and he is in agreement with the above note.    "

## 2024-10-22 VITALS
TEMPERATURE: 97.5 F | WEIGHT: 197.2 LBS | SYSTOLIC BLOOD PRESSURE: 117 MMHG | OXYGEN SATURATION: 97 % | RESPIRATION RATE: 18 BRPM | BODY MASS INDEX: 29.21 KG/M2 | HEIGHT: 69 IN | HEART RATE: 80 BPM | DIASTOLIC BLOOD PRESSURE: 77 MMHG

## 2024-10-22 PROCEDURE — 99238 HOSP IP/OBS DSCHRG MGMT 30/<: CPT

## 2024-10-22 RX ORDER — FOLIC ACID 1 MG/1
1 TABLET ORAL DAILY
Qty: 30 TABLET | Refills: 0 | Status: SHIPPED | OUTPATIENT
Start: 2024-10-22

## 2024-10-22 RX ORDER — LANOLIN ALCOHOL/MO/W.PET/CERES
6 CREAM (GRAM) TOPICAL
Qty: 30 TABLET | Refills: 0 | Status: SHIPPED | OUTPATIENT
Start: 2024-10-22

## 2024-10-22 RX ORDER — HYDROCHLOROTHIAZIDE 50 MG/1
50 TABLET ORAL DAILY
Qty: 30 TABLET | Refills: 0 | Status: SHIPPED | OUTPATIENT
Start: 2024-10-23

## 2024-10-22 RX ORDER — LANOLIN ALCOHOL/MO/W.PET/CERES
100 CREAM (GRAM) TOPICAL DAILY
Qty: 30 TABLET | Refills: 0 | Status: SHIPPED | OUTPATIENT
Start: 2024-10-23

## 2024-10-22 RX ORDER — ERGOCALCIFEROL 1.25 MG/1
50000 CAPSULE, LIQUID FILLED ORAL WEEKLY
Qty: 4 CAPSULE | Refills: 0 | Status: SHIPPED | OUTPATIENT
Start: 2024-10-28 | End: 2024-12-10

## 2024-10-22 RX ORDER — IBUPROFEN 600 MG/1
600 TABLET, FILM COATED ORAL EVERY 6 HOURS PRN
Qty: 60 TABLET | Refills: 0 | Status: SHIPPED | OUTPATIENT
Start: 2024-10-22

## 2024-10-22 RX ORDER — DIPHENOXYLATE HYDROCHLORIDE AND ATROPINE SULFATE 2.5; .025 MG/1; MG/1
1 TABLET ORAL DAILY
Qty: 30 TABLET | Refills: 0 | Status: SHIPPED | OUTPATIENT
Start: 2024-10-22

## 2024-10-22 RX ADMIN — NICOTINE POLACRILEX 4 MG: 4 GUM, CHEWING BUCCAL at 12:35

## 2024-10-22 RX ADMIN — NICOTINE POLACRILEX 4 MG: 4 GUM, CHEWING BUCCAL at 08:37

## 2024-10-22 RX ADMIN — FOLIC ACID 1 MG: 1 TABLET ORAL at 08:37

## 2024-10-22 RX ADMIN — HYDROCHLOROTHIAZIDE 50 MG: 25 TABLET ORAL at 08:37

## 2024-10-22 RX ADMIN — Medication 1 TABLET: at 08:37

## 2024-10-22 RX ADMIN — THIAMINE HCL TAB 100 MG 100 MG: 100 TAB at 08:37

## 2024-10-22 RX ADMIN — NICOTINE POLACRILEX 4 MG: 4 GUM, CHEWING BUCCAL at 10:23

## 2024-10-22 NOTE — BH TRANSITION RECORD
Contact Information: If you have any questions, concerns, pended studies, tests and/or procedures, or emergencies regarding your inpatient behavioral health visit. Please contact [facility] unit [phone] and ask to speak to a , nurse or physician. A contact is available 24 hours/ 7 days a week at this number.     Summary of Procedures Performed During your Stay:  Below is a list of major procedures performed during your hospital stay and a summary of results:  - Major Imaging Studies: ECG on 10/19/2024. QTC interval 410ms. Normal sinus rhythm. Baseline artifact. .    Pending Studies (From admission, onward)       Start     Ordered    10/23/24 0600  CK  Morning draw         10/21/24 1027    10/23/24 0600  CBC and differential  Morning draw         10/21/24 1027    10/20/24 1325  Folate  Once         10/20/24 1324                  Please follow up on the above pending studies with your PCP and/or referring provider.

## 2024-10-22 NOTE — PROGRESS NOTES
10/22/24    Team Meeting   Meeting Type Daily Rounds   Team Members Present   Team Members Present Physician;Nurse;;Occupational Therapist   Physician Team Member Russel RICE, Amna RICE, Brenda RICE, Juli KNAPP   Nursing Team Member Ricardo MARTINES   Social Work Team Member Thang MCKNIGHTW, Colt RÍOSW   OT Team Member Pope CLYDE   Patient/Family Present   Patient Present No   Patient's Family Present No     201, dc home and to rehab

## 2024-10-22 NOTE — NURSING NOTE
Patient observed sleeping comfortably for majority of q15 minute monitoring throughout the night, without demonstrating any signs or symptoms of distress. No acute behaviors overnight. Unlabored even breaths noted. Will remain on safety precautions and continual q15 minute monitoring.

## 2024-10-22 NOTE — DISCHARGE SUMMARY
"Discharge Summary - Behavioral Health   Name: Santiago Monterroso 44 y.o. male I MRN: [id number]  Unit/Bed#: -01 I Date of Admission: 10/19/2024   Date of Service: 10/22/2024 I Hospital Day: 3    Discharge Summary - Behavioral Health   Santiago Monterroso 44 y.o. male MRN: [id number]  Unit/Bed#: -01 Encounter: [id number]     Admission Date: 10/19/2024         Discharge Date: 10/22/2024    Attending Psychiatrist: Darshana Goode MD    Reason for Admission/HPI: Psychosis (HCC) [F29]    HPI per Haja Lima MD on 10/20/2024,    \"Santiago Monterroso is a 44 y.o. male, presenting to [facility], with a past psychiatric history of alcohol use disorder, methamphetamine use disorder, subsequent to being found by police wandering in the woods and exhibiting symptoms of psychosis. The patient tells this writer that his symptoms have since resolved.  He believes that he did methamphetamines that was laced and caused him to have hallucinations.  The patient states that he thought he was going to a [facility] but ended up hanging out with one of his friends who used to use.  He decided to use with him and ended up wandering off to the woods and was found by police. Per chart review the patient reporting going for a hike in the forest and says he walked around 50 miles and remembers finding himself shirtless and believes he had frost bite on his feet. He has already signed a 72 hour notice. As he reflects on what brought him to the hospital he says he made a bad decision and is paying for it. He says he was not angry about the hallucinogenic experience as he had flashbacks about prior wrongdonig and says he wants to work on forgiving people and hopes to be forgiven by others.  He is future oriented and expressed a desire to spend more time with his daughters and admits to make excuses in the past did not hang out with him as much because they like to shop and he " "does not like to do those things.  In regard to his depressive symptoms he denies any symptoms of depression and anxiety.  He has never seen a psychiatrist or been diagnosed with any psychiatric diagnoses before.  When asked about his substance use he does admit to drinking alcohol daily and reports drinking around 3 glasses of wine as well as a 6 pack of beer.  He states he is unable to just have 1 or 2 and will drink till he is drunk. The patient also wants to work on reducing his use as he is asking his partner to remove all alcohol from his house.  When asked why he drinks he says he does it because he enjoys it.  The patient expressed continued desire to attend [facility] for rehab.  He also has external pressures as he mentions that his job is aware he is here and told him that if he does not go to rehab he will not have a job when he returns. \"    Hospital Course: The patient was admitted to the inpatient psychiatric unit and started on every 7 minutes precautions. During the hospitalization the patient was attending individual therapy, group therapy, milieu therapy and occupational therapy.    Patient was started on melatonin 6 mg for insomnia, however refused this as he felt he did not need it. Prior to beginning of treatment medications risks and benefits and possible side effects including risk of impaired next-day mental alertness, complex sleep-related behavior and dependence related to treatment with hypnotic medications were reviewed with the patient. The patient verbalized understanding and agreement for treatment.     Mood was stable at the time of discharge. Reports mood as \"good.\" Santiago adamantly denied suicidal and homicidal ideations, as well as passive death wishes. He did not exhibit any suicidal or homicidal ideation thought content. There was no overt psychosis, kayleigh, or overt delusional content elicited at the time of discharge. Sleep and appetite were improved. The patient was " tolerating medications and was not reporting any significant side effects at the time of discharge.    Santiago signed a 72-hour notice upon admission to psychiatric unit, at the end of the 72-hour notice patient, patient does not appear to be in acute risk to self or others at this time.      Santiago also felt stable and ready for discharge at the end of the hospital stay.  Patient was motivated and reported strong interest and readiness in being discharged and immediately going to [facility] inpatient rehab.  Due to setting up this inpatient stay at [facility], patient declined outpatient treatment services. CM called patient's daughter with permission from patient, and daughter was in agreement/agreed to take patient straight to [facility] and a treatment. Patient reports daughter is very supportive and helped him set up his treatment with [facility].    Santiago was seen this morning in good spirits and reported his readiness and excitement to discharge.  He appeared motivated to participate in [facility] inpatient rehab.  He notes that he made a mistake that led to his admission and reports he is going to work endlessly to gain the trust of his daughter and family again.  He reports very strong support through his daughter as well as his family.  Reports weapons/firearms were already removed from the house and there is no access to lethal means when he discharges from [facility].    Discussed safety plan and identified signs and symptoms of psychiatric decompensation and to speak with family and go to the emergency room should he experience the symptoms.  He is organized, goal directed, and logical. He is forward thinking and notes that he values time with his family, but recognizes he wants to reduce alcohol and drug usage.   He does not appear to be in acute risk to self or others at this time, nor did he exhibit signs/symptoms of psychosis, kayleigh, paranoia, delusional content in conversation today.  He  reports feeling safe at home and with discharge plan to inpatient rehab.    Mental Status at time of Discharge:     Appearance:  Age appropriate, casually dressed, adequate grooming   Behavior:  Calm, cooperative, pleasant, appropriate   Speech:  Normal rate and volume, clear   Mood:  euthymic   Affect:  Normal range and intensity, mood congruent   Thought Process:  Organized, logical, goal directed   Thought Content:  No overt delusions   Perceptual Disturbances: Denies auditory and visual hallucinations when asked, does not appear to be responding to internal stimuli   Risk Potential: Adamantly denies suicidal and homicidal ideations.   Sensorium:  person, place, time/date, and situation   Cognition:  recent and remote memory grossly intact   Consciousness:  alert and awake    Attention: attention span and concentration were age appropriate   Insight:  fair   Judgment: fair   Gait/Station: normal gait/station   Motor Activity: no abnormal movements     Admission Diagnosis:Psychosis ([facility]) [F29]    Discharge Diagnosis:   Principal Problem:    Unspecified mood (affective) disorder ([facility])  Active Problems:    Methamphetamine use disorder, mild, abuse ([facility])    Alcohol use disorder, moderate, dependence ([facility])  Resolved Problems:    * No resolved hospital problems. *        Lab results:  Admission on 10/19/2024   Component Date Value    TSH 3RD GENERATON 10/20/2024 0.834     T3, Free 10/20/2024 3.30     Vitamin B-12 10/20/2024 625     Folate 10/20/2024 10.8     Vit D, 25-Hydroxy 10/20/2024 17.0 (L)     Syphilis Total Antibody 10/20/2024 Non-reactive     Color, UA 10/20/2024 Straw     Clarity, UA 10/20/2024 Clear     Specific Gravity, UA 10/20/2024 1.020     pH, UA 10/20/2024 6.0     Leukocytes, UA 10/20/2024 Negative     Nitrite, UA 10/20/2024 Negative     Protein, UA 10/20/2024 Negative     Glucose, UA 10/20/2024 Negative     Ketones, UA 10/20/2024 Negative     Urobilinogen, UA 10/20/2024 0.2     Bilirubin, UA 10/20/2024  Negative     Occult Blood, UA 10/20/2024 Negative     Amph/Meth UR 10/20/2024 Negative     Barbiturate Ur 10/20/2024 Negative     Benzodiazepine Urine 10/20/2024 Negative     Cocaine Urine 10/20/2024 Negative     Methadone Urine 10/20/2024 Negative     Opiate Urine 10/20/2024 Negative     PCP Ur 10/20/2024 Negative     THC Urine 10/20/2024 Negative     Oxycodone Urine 10/20/2024 Negative     Fentanyl Urine 10/20/2024 Negative     HYDROCODONE URINE 10/20/2024 Negative     Sodium 10/20/2024 139     Potassium 10/20/2024 4.1     Chloride 10/20/2024 105     CO2 10/20/2024 30     ANION GAP 10/20/2024 4     BUN 10/20/2024 15     Creatinine 10/20/2024 1.05     Glucose 10/20/2024 98     Glucose, Fasting 10/20/2024 98     Calcium 10/20/2024 9.5     AST 10/20/2024 34     ALT 10/20/2024 41     Alkaline Phosphatase 10/20/2024 31 (L)     Total Protein 10/20/2024 6.3 (L)     Albumin 10/20/2024 3.7     Total Bilirubin 10/20/2024 0.78     eGFR 10/20/2024 85     Magnesium 10/20/2024 2.2     Phosphorus 10/20/2024 3.5     Cholesterol 10/20/2024 185     Triglycerides 10/20/2024 116     HDL, Direct 10/20/2024 49     LDL Calculated 10/20/2024 113 (H)     Non-HDL-Chol (CHOL-HDL) 10/20/2024 136     Ventricular Rate 10/19/2024 62     Atrial Rate 10/19/2024 62     CA Interval 10/19/2024 144     QRSD Interval 10/19/2024 92     QT Interval 10/19/2024 404     QTC Interval 10/19/2024 410     P Axis 10/19/2024 35     QRS [place] 10/19/2024 44     T Wave [place] 10/19/2024 34        Discharge Medications:  Current Discharge Medication List        START taking these medications    Details   ergocalciferol (VITAMIN D2) 50,000 units Take 1 capsule (50,000 Units total) by mouth once a week for 7 doses  Qty: 4 capsule, Refills: 0    Associated Diagnoses: Vitamin D deficiency      hydroCHLOROthiazide 50 mg tablet Take 1 tablet (50 mg total) by mouth daily  Qty: 30 tablet, Refills: 0    Associated Diagnoses: Hypertension      ibuprofen (MOTRIN) 600 mg tablet  Take 1 tablet (600 mg total) by mouth every 6 (six) hours as needed for moderate pain  Qty: 60 tablet, Refills: 0    Associated Diagnoses: Arthralgia      nicotine polacrilex (NICORETTE) 4 mg gum Chew 1 each (4 mg total) every 2 (two) hours as needed for smoking cessation  Qty: 100 each, Refills: 0    Associated Diagnoses: Nicotine addiction              Current Discharge Medication List        STOP taking these medications       acetaminophen (TYLENOL) 325 mg tablet Comments:   Reason for Stopping:         amLODIPine (NORVASC) 5 mg tablet Comments:   Reason for Stopping:                Current Discharge Medication List        CONTINUE these medications which have CHANGED    Details   folic acid (FOLVITE) 1 mg tablet Take 1 tablet (1 mg total) by mouth daily  Qty: 30 tablet, Refills: 0    Associated Diagnoses: Alcohol abuse      melatonin 3 mg Take 2 tablets (6 mg total) by mouth daily at bedtime  Qty: 30 tablet, Refills: 0    Associated Diagnoses: Insomnia      multivitamin (THERAGRAN) TABS Take 1 tablet by mouth daily  Qty: 30 tablet, Refills: 0    Associated Diagnoses: Alcohol abuse      thiamine 100 MG tablet Take 1 tablet (100 mg total) by mouth daily  Qty: 30 tablet, Refills: 0    Associated Diagnoses: Alcohol abuse              Current Discharge Medication List           Discharge instructions/Information to patient and family:   See after visit summary for information provided to patient and family.      Provisions for Follow-Up Care:  See after visit summary for information related to follow-up care and any pertinent home health orders.      Discharge Statement:    I spent 30 minutes discharging the patient. This time was spent on the day of discharge. I had direct contact with the patient on the day of discharge.     Additional documentation is required if more than 30 minutes were spent on discharge:    I reviewed with Santiago importance of compliance with medications and outpatient treatment after  discharge.  I discussed the medication regimen and possible side effects of the medications with Santiago prior to discharge.  Santiago is not currently on psychiatric medications at this time.  I reviewed with Santiago crisis plan and safety plan upon discharge.  Santiago was competent to understand risks and benefits of withholding information and risks and benefits of his actions.  Santiago is being discharged to an inpatient rehab.    Jamison Bustos PA-C 10/22/24

## 2024-10-22 NOTE — NURSING NOTE
Pt. Is going home with the following items.   Clothing  Slippers    Storage  In his book bag  Clothing  Skoal containers x3  Toothpaste  Iced tea    Security bag # [id number] [place] over with Pt. Security, myself and bht am   X3 $20 Bills =$60

## 2024-10-22 NOTE — NURSING NOTE
Patient escorted to the main lobby with all belongings. All discharge paperwork reviewed with patient. Patient denied SI/HI. Patient appropriate in mood and affect.

## 2024-10-22 NOTE — PLAN OF CARE
Problem: PSYCHOSIS  Goal: Will report no hallucinations or delusions  Description: Interventions:  - Administer medication as  ordered  - Every waking shifts and PRN assess for the presence of hallucinations and or delusions  - Assist with reality testing to support increasing orientation  - Assess if patient's hallucinations or delusions are encouraging self-harm or harm to others and intervene as appropriate  Outcome: Adequate for Discharge     Problem: SUBSTANCE USE/ABUSE  Goal: Will have no detox symptoms and will verbalize plan for changing substance-related behavior  Description: INTERVENTIONS:  - Monitor physical status and assess for symptoms of withdrawal  - Administer medication as ordered  - Provide emotional support with 1 on 1 interaction with staff  - Encourage recovery focused program/ addiction education  - Assess for verbalization of changing behaviors related to substance abuse  - Initiate consults and referrals as appropriate (Case Management, Spiritual Care, etc.)  Outcome: Adequate for Discharge  Goal: By discharge, will develop insight into their chemical dependency and sustain motivation to continue in recovery  Description: INTERVENTIONS:  - Attends all daily group sessions and scheduled AA groups  - Actively practices coping skills through participation in the therapeutic community and adherence to program rules  - Reviews and completes assignments from individual treatment plan  - Assist patient development of understanding of their personal cycle of addiction and relapse triggers  Outcome: Adequate for Discharge  Goal: By discharge, patient will have ongoing treatment plan addressing chemical dependency  Description: INTERVENTIONS:  - Assist patient with resources and/or appointments for ongoing recovery based living  Outcome: Adequate for Discharge     Problem: Ineffective Coping  Goal: Cooperates with admission process  Description: Interventions:   - Complete admission  process  Outcome: Adequate for Discharge  Goal: Identifies ineffective coping skills  Outcome: Adequate for Discharge  Goal: Identifies healthy coping skills  Outcome: Adequate for Discharge  Goal: Demonstrates healthy coping skills  Outcome: Adequate for Discharge  Goal: Participates in unit activities  Description: Interventions:  - Provide therapeutic environment   - Provide required programming   - Redirect inappropriate behaviors   Outcome: Adequate for Discharge  Goal: Patient/Family participate in treatment and DC plans  Description: Interventions:  - Provide therapeutic environment  Outcome: Adequate for Discharge  Goal: Patient/Family verbalizes awareness of resources  Outcome: Adequate for Discharge  Goal: Understands least restrictive measures  Description: Interventions:  - Utilize least restrictive behavior  Outcome: Adequate for Discharge  Goal: Free from restraint events  Description: - Utilize least restrictive measures   - Provide behavioral interventions   - Redirect inappropriate behaviors   Outcome: Adequate for Discharge     Problem: Alteration in Thoughts and Perception  Goal: Treatment Goal: Gain control of psychotic behaviors/thinking, reduce/eliminate presenting symptoms and demonstrate improved reality functioning upon discharge  Outcome: Adequate for Discharge  Goal: Verbalize thoughts and feelings  Description: Interventions:  - Promote a nonjudgmental and trusting relationship with the patient through active listening and therapeutic communication  - Assess patient's level of functioning, behavior and potential for risk  - Engage patient in 1 on 1 interactions  - Encourage patient to express fears, feelings, frustrations, and discuss symptoms    - [place] patient to reality, help patient recognize reality-based thinking   - Administer medications as ordered and assess for potential side effects  - Provide the patient education related to the signs and symptoms of the illness and desired  effects of prescribed medications  Outcome: Adequate for Discharge  Goal: Refrain from acting on delusional thinking/internal stimuli  Description: Interventions:  - Monitor patient closely, per order   - Utilize least restrictive measures   - Set reasonable limits, give positive feedback for acceptable   - Administer medications as ordered and monitor of potential side effects  Outcome: Adequate for Discharge  Goal: Agree to be compliant with medication regime, as prescribed and report medication side effects  Description: Interventions:  - Offer appropriate PRN medication and supervise ingestion; conduct AIMS, as needed   Outcome: Adequate for Discharge  Goal: Attend and participate in unit activities, including therapeutic, recreational, and educational groups  Description: Interventions:  -Encourage Visitation and family involvement in care  Outcome: Adequate for Discharge  Goal: Recognize dysfunctional thoughts, communicate reality-based thoughts at the time of discharge  Description: Interventions:  - Provide medication and psycho-education to assist patient in compliance and developing insight into his/her illness   Outcome: Adequate for Discharge  Goal: Complete daily ADLs, including personal hygiene independently, as able  Description: Interventions:  - Observe, teach, and assist patient with ADLS  - Monitor and promote a balance of rest/activity, with adequate nutrition and elimination   Outcome: Adequate for Discharge

## 2024-10-22 NOTE — PROGRESS NOTES
"Progress Note - Santiago Monterroso 44 y.o. male MRN: [id number]    Unit/Bed#: [facility] 220-01 Encounter: [id number]        Subjective:   Patient seen and examined at bedside after reviewing the chart and discussing the case with the caring staff.      Patient examined at bedside.  Patient denies any acute symptoms.     Patient is being discharged today.  Patient is requesting prescriptions.  I reviewed and reconciled patient's problem list and medications.    Physical Exam   Vitals: Blood pressure 117/77, pulse 80, temperature 97.5 °F (36.4 °C), temperature source Temporal, resp. rate 18, height 5' 9\" (1.753 m), weight 89.4 kg (197 lb 3.2 oz), SpO2 97%.,Body mass index is 29.12 kg/m².  Constitutional: He appears well-developed.   HEENT: PERR, EOMI, MMM  Cardiovascular: Normal rate and regular rhythm.    Pulmonary/Chest: Effort normal and breath sounds normal.   Abdomen: Soft, + BS, NT    Assessment/Plan:  Santiago Monterroso is a(n) 44 y.o. year old male with Psychosis NOS    Medical clearance.  Patient is medically cleared for discharge.  All scripts will be sent out for the patient.     Hypertension.  Incr HCTZ to 50 mg daily 10/20.  Clonidine 0.1 mg every 6 hours as needed for SBP > 160 and DBP > 90.  Patient does not tolerate amlodipine (edema) or lisinopril (cough).  URIAH.  Resolved with IV fluids at previous hospital.   Elevated CK.  Level 1,595 on 10/18.  Continue to trend down.  Nicotine abuse.  NRT.  Alcohol abuse.  Patient has been put on thiamine, folic acid and multivitamin.  Insomnia.  Patient is on melatonin 6 mg at bedtime.  Arthralgia/headache.  Patient may get ibuprofen as needed.  Vitamin D deficiency.  Level 17.0 ng.mL on 10/20.  Start patient on weekly Vit D2 supplement x 8 weeks followed by daily supplement.   "

## 2024-10-22 NOTE — NURSING NOTE
Patient observed in milieu, mostly withdrawn to self. Bright, pleasant, and direct with assessment responses. He denies any acute issues this evening. He looks forward to discharging to rehabilitation facility tomorrow and offers no questions or concerns. Denies SI/HI and hallucinations. Did not appear internally preoccupied. Makes needs/wants known. Q15 minute checks ongoing.

## 2024-10-23 LAB — BACTERIA BLD CULT: NORMAL
